# Patient Record
Sex: MALE | Race: AMERICAN INDIAN OR ALASKA NATIVE
[De-identification: names, ages, dates, MRNs, and addresses within clinical notes are randomized per-mention and may not be internally consistent; named-entity substitution may affect disease eponyms.]

---

## 2018-01-24 ENCOUNTER — HOSPITAL ENCOUNTER (INPATIENT)
Dept: HOSPITAL 42 - ED | Age: 83
LOS: 6 days | Discharge: NURSING, REHAB OR LONG-TERM CARE | DRG: 682 | End: 2018-01-30
Attending: INTERNAL MEDICINE | Admitting: INTERNAL MEDICINE
Payer: MEDICARE

## 2018-01-24 VITALS — BODY MASS INDEX: 6.7 KG/M2

## 2018-01-24 VITALS — HEART RATE: 65 BPM

## 2018-01-24 DIAGNOSIS — I48.2: ICD-10-CM

## 2018-01-24 DIAGNOSIS — I25.2: ICD-10-CM

## 2018-01-24 DIAGNOSIS — I27.21: ICD-10-CM

## 2018-01-24 DIAGNOSIS — N17.9: Primary | ICD-10-CM

## 2018-01-24 DIAGNOSIS — I65.23: ICD-10-CM

## 2018-01-24 DIAGNOSIS — E46: ICD-10-CM

## 2018-01-24 DIAGNOSIS — I69.344: ICD-10-CM

## 2018-01-24 DIAGNOSIS — Z79.01: ICD-10-CM

## 2018-01-24 DIAGNOSIS — N40.0: ICD-10-CM

## 2018-01-24 DIAGNOSIS — M40.209: ICD-10-CM

## 2018-01-24 DIAGNOSIS — I35.1: ICD-10-CM

## 2018-01-24 DIAGNOSIS — E78.5: ICD-10-CM

## 2018-01-24 DIAGNOSIS — E87.2: ICD-10-CM

## 2018-01-24 DIAGNOSIS — I08.1: ICD-10-CM

## 2018-01-24 DIAGNOSIS — R33.9: ICD-10-CM

## 2018-01-24 DIAGNOSIS — E78.00: ICD-10-CM

## 2018-01-24 DIAGNOSIS — N25.81: ICD-10-CM

## 2018-01-24 DIAGNOSIS — D61.818: ICD-10-CM

## 2018-01-24 DIAGNOSIS — Z95.810: ICD-10-CM

## 2018-01-24 DIAGNOSIS — I21.4: ICD-10-CM

## 2018-01-24 DIAGNOSIS — E83.42: ICD-10-CM

## 2018-01-24 DIAGNOSIS — I42.0: ICD-10-CM

## 2018-01-24 DIAGNOSIS — K59.00: ICD-10-CM

## 2018-01-24 DIAGNOSIS — N43.3: ICD-10-CM

## 2018-01-24 DIAGNOSIS — I50.21: ICD-10-CM

## 2018-01-24 DIAGNOSIS — I11.0: ICD-10-CM

## 2018-01-24 DIAGNOSIS — E55.9: ICD-10-CM

## 2018-01-24 DIAGNOSIS — I73.9: ICD-10-CM

## 2018-01-24 DIAGNOSIS — M17.0: ICD-10-CM

## 2018-01-24 DIAGNOSIS — M62.82: ICD-10-CM

## 2018-01-24 DIAGNOSIS — I25.10: ICD-10-CM

## 2018-01-24 LAB
ALBUMIN SERPL-MCNC: 4 G/DL (ref 3–4.8)
ALBUMIN/GLOB SERPL: 1 {RATIO} (ref 1.1–1.8)
ALT SERPL-CCNC: 73 U/L (ref 7–56)
APPEARANCE UR: (no result)
AST SERPL-CCNC: 348 U/L (ref 17–59)
BACTERIA #/AREA URNS HPF: (no result) /[HPF]
BASOPHILS # BLD AUTO: 0 K/MM3 (ref 0–2)
BASOPHILS NFR BLD: 0 % (ref 0–3)
BILIRUB UR-MCNC: NEGATIVE MG/DL
BNP SERPL-MCNC: (no result) PG/ML (ref 0–450)
BUN SERPL-MCNC: 57 MG/DL (ref 7–21)
CALCIUM SERPL-MCNC: 9.7 MG/DL (ref 8.4–10.5)
CK MB SERPL-MCNC: 43.1 NG/ML (ref 0–3.6)
CK MB%: 0.4 % (ref 2.5–3)
COLOR UR: YELLOW
EOSINOPHIL # BLD: 0 10*3/UL (ref 0–0.7)
EOSINOPHIL NFR BLD: 0 % (ref 1.5–5)
ERYTHROCYTE [DISTWIDTH] IN BLOOD BY AUTOMATED COUNT: 13.8 % (ref 11.5–14.5)
GFR NON-AFRICAN AMERICAN: 18
GLUCOSE UR STRIP-MCNC: NEGATIVE MG/DL
GRANULOCYTES # BLD: 4.13 10*3/UL (ref 1.4–6.5)
GRANULOCYTES NFR BLD: 76.6 % (ref 50–68)
HGB BLD-MCNC: 15.2 G/DL (ref 14–18)
LEUKOCYTE ESTERASE UR-ACNC: NEGATIVE LEU/UL
LYMPHOCYTES # BLD: 0.7 10*3/UL (ref 1.2–3.4)
LYMPHOCYTES NFR BLD AUTO: 12.8 % (ref 22–35)
MAGNESIUM SERPL-MCNC: 1.7 MG/DL (ref 1.7–2.2)
MCH RBC QN AUTO: 30.6 PG (ref 25–35)
MCHC RBC AUTO-ENTMCNC: 33.3 G/DL (ref 31–37)
MCV RBC AUTO: 91.9 FL (ref 80–105)
MONOCYTES # BLD AUTO: 0.6 10*3/UL (ref 0.1–0.6)
MONOCYTES NFR BLD: 10.6 % (ref 1–6)
PH UR STRIP: 6 [PH] (ref 4.7–8)
PLATELET # BLD: 78 10^3/UL (ref 120–450)
PMV BLD AUTO: 12.6 FL (ref 7–11)
PROT UR STRIP-MCNC: 30 MG/DL
RBC # BLD AUTO: 4.96 10^6/UL (ref 3.5–6.1)
RBC # UR STRIP: (no result) /UL
RBC #/AREA URNS HPF: (no result) /HPF (ref 0–2)
SP GR UR STRIP: >= 1.03 (ref 1–1.03)
T3 SERPL-MCNC: 0.69 NG/ML (ref 0.97–1.69)
T4 FREE SERPL-MCNC: 1.23 NG/DL (ref 0.78–2.19)
TROPONIN I SERPL-MCNC: 0.13 NG/ML
URINE NITRATE: NEGATIVE
UROBILINOGEN UR STRIP-ACNC: 0.2 E.U./DL
WBC # BLD AUTO: 5.4 10^3/UL (ref 4.5–11)
WBC #/AREA URNS HPF: (no result) /HPF (ref 0–6)

## 2018-01-24 NOTE — ED PDOC
Arrival/HPI





- General


Chief Complaint: Weakness/Neurological Deficit


Time Seen by Provider: 01/24/18 18:27





- History of Present Illness


Narrative History of Present Illness (Text): 


01/24/18 18:26


A 89 year old male, whose past medical history includes CVA, HTN, 

hypercholestremia, and Atrial fibrillation, presents to the emergency 

department complaining of generalized weakness for 3 days. Patient reports he 

is unable to get up from bed and has been unable to eat/drink since for 3 days. 

Patient denies any chest pain, shortness of breath, or any other complaints. 

Patient is uncertain who is his PMD. 


Time/Duration: < week (3 days)


Symptom Onset: Gradual


Symptom Course: Unchanged


Activities at Onset: Light


Context: Home





Past Medical History





- Provider Review


Nursing Documentation Reviewed: Yes





- Infectious Disease


Hx of Infectious Diseases: None





- Tetanus Immunization


Tetanus Immunization: Unknown





- Cardiac


Hx Pacemaker: Yes





- Neurological


Hx Paralysis: Yes (HX. R FACIAL DROOP)





- Hematological/Oncological


Hx Blood Transfusions: No





- Musculoskeletal/Rheumatological


Hx Musculoskeletal Disorders: No





- Psychiatric


Hx Depression: No


Hx Emotional Abuse: No


Hx Physical Abuse: No


Hx Substance Use: No





- Past Surgical History


Past Surgical History: Unable to Obtain





- Anesthesia


Hx Anesthesia: No


Hx Anesthesia Reactions: No


Hx Malignant Hyperthermia: No





- Suicidal Assessment


Feels Threatened In Home Enviroment: No





Family/Social History





- Physician Review


Nursing Documentation Reviewed: Yes


Family/Social History: No Known Family HX


Smoking Status: Never Smoked


Hx Alcohol Use: No


Hx Substance Use: No


Hx Substance Use Treatment: No





Allergies/Home Meds


Allergies/Adverse Reactions: 


Allergies





No Known Allergies Allergy (Verified 07/02/16 14:25)


 








Home Medications: 


 Home Meds











 Medication  Instructions  Recorded  Confirmed


 


Ergocalciferol (Vitamin D2) 50,000 iu PO WED 10/17/14 01/24/18





[Vitamin D]   


 


Simvastatin 40 mg PO DAILY 10/17/14 01/24/18


 


Cephalexin [Keflex] 500 mg PO Q12H 07/02/16 01/24/18


 


Lisinopril [Zestril] 2.5 mg PO DAILY 07/02/16 01/24/18


 


Warfarin Sodium [Jantoven] 3 mg PO DAILY 01/24/18 01/24/18














Review of Systems





- Physician Review


All systems were reviewed & negative as marked: Yes





- Review of Systems


Constitutional: Other (weakness)


Gastrointestinal: Appetite Changes (unable to eat/drink for past 3 days (since 

start of symptom))





Physical Exam


Vital Signs Reviewed: Yes


Vital Signs











  Temp Pulse Resp BP Pulse Ox


 


 01/24/18 17:59  97.5 F L    


 


 01/24/18 17:16   82  17  108/79  97











Temperature: Afebrile


Blood Pressure: Normal


Pulse: Regular


Respiratory Rate: Normal


Appearance: Positive for: Well-Appearing


Pain Distress: None


Mental Status: Positive for: Alert and Oriented X 3





- Systems Exam


Head: Present: Atraumatic, Normocephalic


Pupils: Present: PERRL


Extroacular Muscles: Present: EOMI


Conjunctiva: Present: Normal


Mouth: Present: Dry


Neck: Present: Normal Range of Motion


Respiratory/Chest: Present: Clear to Auscultation, Good Air Exchange, Other (

pacemaker to left chest wall).  No: Respiratory Distress, Accessory Muscle Use


Cardiovascular: Present: Regular Rate and Rhythm, Normal S1, S2.  No: Murmurs


Abdomen: Present: Normal Bowel Sounds.  No: Tenderness, Distention, Peritoneal 

Signs


Back: Present: Normal Inspection


Upper Extremity: Present: Normal Inspection.  No: Cyanosis, Edema


Lower Extremity: Present: Normal Inspection.  No: Edema


Neurological: Present: GCS=15, CN II-XII Intact, Speech Normal


Skin: Present: Warm, Dry, Normal Color.  No: Rashes


Psychiatric: Present: Alert, Oriented x 3, Normal Insight, Normal Concentration





Medical Decision Making


ED Course and Treatment: 


01/24/18 18:28


Impression: 89 year old male with weakness. Physical exam shows mucous 

membranes are dry; pacemaker to left chest wall; overall normal physical 

examination.





Plan:


-- EKG


-- Head CT


-- Chest X-ray


-- Labs


-- Urine Culture


-- Urinalysis 


-- Reassess and disposition





Prior Visits:


Notes and results from previous visits were reviewed. Patient was last seen in 

the emergency department on 07/02/2016 for neck pain. Patient was d/c home.





Progress Notes:


EKG:


Ordered, reviewed, and independently interpreted the EKG.


Rate : 63 BPM


Rhythm : Pacemaker.


Interpretation : No ST-segment elevations or depressions, no T-wave inversions, 

normal intervals.


Comparison : No previous EKG for comparison.





01/24/2018 20:21


Head CT


IMPRESSION: 


1. Hypodense encephalomalacia is identified involving the left insular cortex 

and frontal lobe, consistent with an old infarct. 


2. No acute intracranial hemorrhage or acute territorial type infarct.


3. There are scattered foci of hypodensity within the cerebral white matter, 

likely representing small vessel ischemic disease in a patient this age. 


4. Mild atrophy. 


5. Paranasal sinus disease is noted above. If the patient has facial trauma, a 

facial CT is recommended.


Dictator: Isidoro Mayo MD





- Lab Interpretations


Lab Results: 








 01/24/18 19:00 





 01/24/18 19:00 





 Lab Results





01/24/18 19:00: Free T4 1.23, Total T3 0.69 L, TSH 3rd Generation 1.66


01/24/18 19:00: Sodium 142, Potassium 4.3, Chloride 105, Carbon Dioxide 24, 

Anion Gap 18, BUN 57 H, Creatinine 3.3 H, Est GFR ( Amer) 21, Est GFR (

Non-Af Amer) 18, Random Glucose 96, Calcium 9.7, Magnesium 1.7, Total Bilirubin 

1.5 H,  H, ALT 73 H, Alkaline Phosphatase 50, Lactate Dehydrogenase 1819 

H, Total Creatine Kinase 72549 H, CK-MB (CK-2) 43.1 H, CK-MB (CK-2) % 0.4 L, 

Troponin I 0.13 H* D, NT-Pro-B Natriuret Pep 42213 H, Total Protein 7.9, 

Albumin 4.0, Globulin 4.0, Albumin/Globulin Ratio 1.0 L


01/24/18 19:00: WBC 5.4, RBC 4.96, Hgb 15.2, Hct 45.6, MCV 91.9, MCH 30.6, MCHC 

33.3, RDW 13.8, Plt Count 78 L, MPV 12.6 H, Gran % 76.6 H, Lymph % (Auto) 12.8 L

, Mono % (Auto) 10.6 H, Eos % (Auto) 0.0 L, Baso % (Auto) 0.0, Gran # 4.13, 

Lymph # 0.7 L, Mono # 0.6, Eos # 0.0, Baso # 0.00








I have reviewed the lab results: Yes





- RAD Interpretation


Radiology Orders: 








01/24/18 18:29


HEAD W/O CONTRAST [CT] Stat 


CHEST PORTABLE [RAD] Stat 











: ED Physician, Radiologist





- EKG Interpretation


Interpreted by ED Physician: Yes


Type: 12 lead EKG





- Medication Orders


Current Medication Orders: 








Sodium Chloride (Sodium Chloride 0.9%)  1,000 mls @ 250 mls/hr IV .Q4H ONE


   Stop: 01/25/18 00:03


   Last Admin: 01/24/18 20:20  Dose: 250 mls/hr





eMAR Start Stop


 Document     01/24/18 20:20  SF  (Rec: 01/24/18 20:52  SF  [email or web address])


     Intravenous Solution


      Start Date                                 01/24/18


      Start Time                                 20:20


      End Date                                   01/24/18














- Scribe Statement


The provider has reviewed the documentation as recorded by the Jasmine Geronimo





Provider Scribe Attestation:


All medical record entries made by the Dgibdillon were at my direction and 

personally dictated by me. I have reviewed the chart and agree that the record 

accurately reflects my personal performance of the history, physical exam, 

medical decision making, and the department course for this patient. I have 

also personally directed, reviewed, and agree with the discharge instructions 

and disposition.








Disposition/Present on Arrival





- Present on Arrival


Any Indicators Present on Arrival: No


History of DVT/PE: No


History of Uncontrolled Diabetes: No


Urinary Catheter: No


History of Decub. Ulcer: No


History Surgical Site Infection Following: None





- Disposition


Have Diagnosis and Disposition been Completed?: Yes


Diagnosis: 


 Acute renal failure, Weakness, Elevated troponin





Disposition: HOSPITALIZED


Disposition Time: 21:00


Condition: FAIR

## 2018-01-24 NOTE — CT
EXAM:

  CT Head Without Intravenous Contrast



EXAM DATE/TIME:

  1/24/2018 6:29 PM



CLINICAL HISTORY:

  The patient age is 89 years old and is male; Injury or trauma; Fall; Initial 

encounter; Blunt trauma (contusions or hematomas); Additional info: R/O ich 

Facility exam id and description: Ct heads head w/o contrast



TECHNIQUE:

  Axial computed tomography images of the head/brain without intravenous 

contrast.  All CT scans at this facility use one or more dose reduction 

techniques, viz.: automated exposure control; ma/kV adjustment per patient size 

(including targeted exams where dose is matched to indication; i.e. head); or 

iterative reconstruction technique.

  Coronal and sagittal reformatted images were created and reviewed.



COMPARISON:

  No relevant prior studies available.



FINDINGS:

  Brain:  Hypodense encephalomalacia is identified involving the left insular 

cortex and frontal lobe, consistent with an old infarct.  There are scattered 

foci of hypodensity within the cerebral white matter, likely representing small 

vessel ischemic disease in a patient this age.  Hypodense probable artifact is 

seen at the medial aspects of the anterior temporal lobes bilaterally.  The 

acuity of the white matter disease is indeterminate.  The white-gray 

differentiation is otherwise preserved demonstrating no acute territorial type 

infarct.  There is mild prominence of the ventricles and sulci, compatible with 

atrophy.  No acute intracranial hemorrhage is seen.

  Midline shift:  There is no midline shift.

  Ventricles:  A small cavum septum pellucidum variant is visualized.

  Bones/joints:  The calvarium demonstrates no evidence for a depressed 

fracture.

  Soft tissues:  No acute abnormality.

  Vasculature:  There is atherosclerotic calcification of the cavernous 

internal carotid arteries.

  Sinuses:  There is mild mucosal thickening of scattered ethmoid air cells. 

Small air fluid levels are visualized within the right sphenoid sinus.

  Mastoid air cells:  No mastoid effusion.



IMPRESSION:     

1.  Hypodense encephalomalacia is identified involving the left insular cortex 

and frontal lobe, consistent with an old infarct.

2.  No acute intracranial hemorrhage or acute territorial type infarct.

3.  There are scattered foci of hypodensity within the cerebral white matter, 

likely representing small vessel ischemic disease in a patient this age.

4.  Mild atrophy.

5.  Paranasal sinus disease is noted above. If the patient has facial trauma, a 

facial CT is recommended.

## 2018-01-25 LAB
ALBUMIN SERPL-MCNC: 2.9 G/DL (ref 3–4.8)
ALBUMIN/GLOB SERPL: 0.9 {RATIO} (ref 1.1–1.8)
ALT SERPL-CCNC: 63 U/L (ref 7–56)
APTT BLD: 33.4 SECONDS (ref 25.1–36.5)
APTT BLD: 33.4 SECONDS (ref 25.1–36.5)
AST SERPL-CCNC: 218 U/L (ref 17–59)
BASOPHILS # BLD AUTO: 0.01 K/MM3 (ref 0–2)
BASOPHILS NFR BLD: 0.2 % (ref 0–3)
BUN SERPL-MCNC: 52 MG/DL (ref 7–21)
CALCIUM SERPL-MCNC: 8.4 MG/DL (ref 8.4–10.5)
CK MB SERPL-MCNC: 22.2 NG/ML (ref 0–3.6)
CK MB%: 0.5 % (ref 2.5–3)
EOSINOPHIL # BLD: 0 10*3/UL (ref 0–0.7)
EOSINOPHIL NFR BLD: 0 % (ref 1.5–5)
ERYTHROCYTE [DISTWIDTH] IN BLOOD BY AUTOMATED COUNT: 13.8 % (ref 11.5–14.5)
FOLATE SERPL-MCNC: 18.2 NG/ML
GFR NON-AFRICAN AMERICAN: 30
GRANULOCYTES # BLD: 2.69 10*3/UL (ref 1.4–6.5)
GRANULOCYTES NFR BLD: 60.8 % (ref 50–68)
HGB BLD-MCNC: 12.3 G/DL (ref 14–18)
INR PPP: 2.31 (ref 0.93–1.08)
INR PPP: 2.66 (ref 0.93–1.08)
LYMPHOCYTES # BLD: 0.9 10*3/UL (ref 1.2–3.4)
LYMPHOCYTES NFR BLD AUTO: 20.5 % (ref 22–35)
MCH RBC QN AUTO: 30 PG (ref 25–35)
MCHC RBC AUTO-ENTMCNC: 32.4 G/DL (ref 31–37)
MCV RBC AUTO: 92.7 FL (ref 80–105)
MONOCYTES # BLD AUTO: 0.8 10*3/UL (ref 0.1–0.6)
MONOCYTES NFR BLD: 18.5 % (ref 1–6)
PLATELET # BLD: 70 10^3/UL (ref 120–450)
PMV BLD AUTO: 12.6 FL (ref 7–11)
PROTHROMBIN TIME: 26.8 SECONDS (ref 9.4–12.5)
PROTHROMBIN TIME: 31.2 SECONDS (ref 9.4–12.5)
RBC # BLD AUTO: 4.1 10^6/UL (ref 3.5–6.1)
TROPONIN I SERPL-MCNC: 0.09 NG/ML
VIT B12 SERPL-MCNC: 416 PG/ML (ref 239–931)
WBC # BLD AUTO: 4.4 10^3/UL (ref 4.5–11)

## 2018-01-25 RX ADMIN — DOBUTAMINE HYDROCHLORIDE PRN MLS/HR: 200 INJECTION INTRAVENOUS at 12:42

## 2018-01-25 NOTE — CT
PROCEDURE:  CT HEAD WITHOUT CONTRAST.



HISTORY:

Syncope 



COMPARISON:

01/24/2018. 



TECHNIQUE:

Axial computed tomography images were obtained through the head/brain 

without intravenous contrast.  



Radiation dose:



Total exam DLP = 885.41 mGy-cm.



This CT exam was performed using one or more of the following dose 

reduction techniques: Automated exposure control, adjustment of the 

mA and/or kV according to patient size, and/or use of iterative 

reconstruction technique.



FINDINGS:



HEMORRHAGE:

No intracranial hemorrhage. 



BRAIN:

There is redemonstration of cystic encephalomalacia in the left 

frontal and anterior parietal lobes.  There is no mass, mass effect 

or abnormal extra-axial fluid collection.  There are mild chronic 

microangiopathic changes.



VENTRICLES:

There is mild age-related global parenchymal volume loss and 

proportionate enlargement of the ventricles and cortical sulci. 



CALVARIUM:

The skull base and calvarium are normal.



PARANASAL SINUSES:

There is mild mucosal thickening in the right maxillary sinus.  The 

remaining included paranasal sinuses are predominantly clear



MASTOID AIR CELLS:

Predominantly clear.



OTHER FINDINGS:

None.



IMPRESSION:

No acute intracranial abnormality.  



Left frontal and anterior parietal lobe cystic encephalomalacia, 

sequela of remote MCA territory infarction.



Mild chronic microangiopathic changes and mild age-related global 

parenchymal volume loss.

## 2018-01-25 NOTE — CARD
--------------- APPROVED REPORT --------------





EKG Measurement

Heart [id number]

IN [id number]

[id number]

[id number]

[id number]



<Conclusion>

AV sequential or dual chamber electronic pacemaker

PVCs

## 2018-01-25 NOTE — RAD
HISTORY:

r/o infiltrate  



COMPARISON:

03/26/2013 



LUNGS::  The lungs are well inflated and clear.



PLEURA:  No significant pleural effusion identified, no pneumothorax 

apparent.



CARDIOVASCULAR:  There is persistent mild cardiomegaly.  There is 

stable position of a left-sided pacemaker. 



OSSEOUS STRUCTURES:  No significant abnormalities.



VISUALIZED UPPER ABDOMEN:  Normal.



OTHER FINDINGS:  None.



IMPRESSION:

No active pulmonary disease.

## 2018-01-25 NOTE — US
EXAM:

  US Abdomen Complete



EXAM DATE/TIME:

  1/24/2018 11:29 PM



CLINICAL HISTORY:

  The patient age is 89 years old and is male; Pain; Abdominal pain; 

Generalized; Additional info: Transaminitis Facility exam id and description: 

Us abd abdomen complete



TECHNIQUE:

  Real-time ultrasound of the abdomen (complete) with image documentation.



COMPARISON:

  No relevant prior studies available.



FINDINGS:

  Liver:  The liver measures 13.1 cm in length.  There is normal echotexture of 

the liver.

  Gallbladder:  There is wall thickening of the gallbladder. No shadowing 

gallstones are visualized within the gallbladder.  The sonographic Moore sign 

is negative.  There is no visualized sludge within the gallbladder.

  Common bile duct:  There is a tiny focus of hyperechogenicity within the 

common bile duct, and a common bile duct stone is considered, although there is 

no posterior acoustic shadowing to confirm this finding.  The common bile duct 

measures 0.6 cm in diameter, which is at the upper limits of normal.  

  Pancreas:  There is a limited evaluation of the pancreas.

  Kidneys:  The right kidney measures 9.7 x 4.0 x 3.9 cm.  The left kidney 

measures 9.8 x 4.7 x 4.7 cm.  There is no hydronephrosis or shadowing 

nephrolithiasis within the kidneys. Artifact limits evaluation of the left 

kidney.

  Spleen:  The spleen measures 11.4 x 3.8 x 4.4 cm.  The spleen is normal in 

echotexture.

  Aorta: Not imaged.

  Inferior vena cava: Not imaged.



IMPRESSION:     

1.  There is wall thickening of the gallbladder. No shadowing gallstones are 

visualized within the gallbladder.

2.  There is a tiny focus of hyperechogenicity within the common bile duct, and 

a common bile duct stone is considered. The common bile duct measures 0.6 cm in 

diameter, which is at the upper limits of normal.  This can be further 

evaluated with ERCP.

3.  Additional findings described above.

## 2018-01-25 NOTE — CARD
--------------- APPROVED REPORT --------------





EKG Measurement

Heart [id number]

[place] [id number]

[id number]

[id number]

[id number]



<Conclusion>

AV sequential or dual chamber electronic pacemaker

## 2018-01-25 NOTE — CT
EXAM:

  CT Abdomen and Pelvis Without Intravenous Contrast



CLINICAL HISTORY:

  The patient age is 89 years old and is male; Pain; Abdominal pain; 

Generalized; Chest pain; Additional info: Transaminitis. With po contrast-as 

discussed Facility exam id and description: Ct chabdpelpo chest, abdomen, 

pelvis po only



TECHNIQUE:

  Axial computed tomography images of the abdomen and pelvis without 

intravenous contrast.  All CT scans at this facility use one or more dose 

reduction techniques, viz.: automated exposure control; ma/kV adjustment per 

patient size (including targeted exams where dose is matched to indication; 

i.e. head); or iterative reconstruction technique.

  Coronal and sagittal reformatted images were created and reviewed.



COMPARISON:

  No relevant prior studies available.



FINDINGS:

  Limitations:  This study is limited by the absence of intravenous contrast.



 ABDOMEN:

  Liver:  No mass.

  Gallbladder and bile ducts:  There is wall thickening of the gallbladder, 

without discrete gallstones.

  Pancreas:  Normal contour.  No ductal dilation.

  Spleen:  No splenomegaly.

  Adrenals:  No mass.

  Kidneys and ureters:  There is bilateral renal pelviectasis with mild 

bilateral hydroureter. No obstructing calculi are visualized.

  Stomach and bowel:  No obstruction. Moderate fecal material is visualized 

within the rectum. There is wall thickening of the distal stomach, consistent 

with incomplete distention or gastritis.

  Appendix:  No findings to suggest acute appendicitis.



 PELVIS:

  Bladder:  A Calle catheter is visualized within the bladder.  There is 

nonspecific bladder wall thickening with adjacent stranding of the mesentery. 

Cystitis is considered. Additional pathology cannot be excluded.

  Reproductive:  The prostate is enlarged.  There is a right-sided hydrocele 

within the visualized scrotum.



 ABDOMEN and PELVIS:

  Intraperitoneal space:  There is a small amount of free fluid within the 

pelvis.

  Bones/joints:  Hypertrophic degenerative changes are noted within the spine.  

There is grade I anterolisthesis of L3 on L4.

  Soft tissues:  Extensive muscle atrophy is visualized.

  Vasculature:  There is atherosclerotic calcification of the abdominal aorta.  

Additional atherosclerotic changes are identified of the iliac arteries and 

visualized femoral arteries.

  Lymph nodes:  No enlarged lymph nodes.

  Other findings:  There is stranding of the abdominal and pelvic mesenteric, 

which may be inflammatory.



IMPRESSION:     

1.  There is bilateral renal pelviectasis with mild bilateral hydroureter. No 

obstructing calculi are visualized.

2.  There is wall thickening of the gallbladder, without discrete gallstones.  

Clinical correlation and possible ultrasound are recommended.

3.  The prostate is enlarged.

4.  There is nonspecific bladder wall thickening with adjacent stranding of the 

mesentery. Cystitis is considered. Clinical correlation is recommended.

5.  There is a right-sided hydrocele within the visualized scrotum.

6.  There is stranding of the abdominal and pelvic mesenteric, which may be 

inflammatory.

7.  There is a small amount of free fluid within the pelvis.

8.  There is wall thickening of the distal stomach, consistent with incomplete 

distention or gastritis.

9.  Additional CT findings described above.



_______________________________________________



EXAM:

  CT Chest Without Intravenous Contrast



EXAM DATE/TIME:

  1/24/2018 11:10 PM



CLINICAL HISTORY:

  The patient age is 89 years old and is male; Pain; Abdominal pain; 

Generalized; Chest pain; Additional info: Transaminitis. With po contrast-as 

discussed Facility exam id and description: Ct chabdpelpo chest, abdomen, 

pelvis po only



TECHNIQUE:

  Axial computed tomography images of the chest without intravenous contrast.  

All CT scans at this facility use one or more dose reduction techniques, viz.: 

automated exposure control; ma/kV adjustment per patient size (including 

targeted exams where dose is matched to indication; i.e. head); or iterative 

reconstruction technique.

  Coronal and sagittal reformatted images were created and reviewed.



COMPARISON:

  DX - CHEST PORTABLE 2018-01-24 19:02



FINDINGS:

  Lungs:  Patchy nonspecific groundglass density and atelectatic changes are 

visualized within both lower lobes.  Atelectatic change or parenchymal scarring 

is visualized within the right pulmonary apex.  No lung mass.

  Pleural space:  No pneumothorax.  No significant effusion.

  Heart:  There is cardiomegaly.  There is a small amount of pericardial fluid, 

which is greater than fluid attenuation. This is consistent with increased 

complexity of the fluid or hemopericardium.

  Bones/joints:  Osteopenia.  Hypertrophic degenerative changes are noted 

within the spine.  There is increased kyphosis of the thoracic spine.

  Vasculature:  No thoracic aortic aneurysm.

  Lymph nodes:  There is abnormal isodensity within the AP window, consistent 

with lymphadenopathy or extension of the complex pericardial fluid.  Scattered 

additional small mediastinal lymph nodes are visualized, some which are 

calcified.

  Tubes, lines and devices:  A left-sided pacemaker is visualized.  Pacer leads 

are visualized within the heart.



IMPRESSION:     

1.  There is cardiomegaly.

2.  There is a small amount of pericardial fluid, which is greater than fluid 

attenuation. This is consistent with increased complexity of the fluid or 

hemopericardium.

3.  There is abnormal isodensity within the AP window, consistent with 

lymphadenopathy or extension of the complex pericardial fluid.

4.  Patchy nonspecific groundglass density and atelectatic changes are 

visualized within both lower lobes.

5.  Additional CT findings described above.

## 2018-01-25 NOTE — US
PROCEDURE:  Bilateral carotid artery duplex ultrasound 



HISTORY:

Carotid stenosis syncope



PHYSICIAN(S):  Humberto Grace MD.



TECHNIQUE:

Duplex sonography and color-flow Doppler were used to evaluate the 

carotid bifurcations and limited segments of the vertebral arteries 

bilaterally.



FINDINGS:

There is mild smooth heterogeneous plaque noted at the carotid 

bifurcations bilaterally. The peak systolic velocity in the proximal 

right internal carotid artery is 56 cm/sec. This corresponds to a 20 

to 39% proximal right ICA stenosis. Normal systolic velocities are 

noted in the proximal right external carotid artery. There is 

antegrade flow in the right vertebral artery.



The peak systolic velocity in the proximal left internal carotid 

artery is 57 cm/sec. This corresponds to a 20 to 39% proximal left 

ICA stenosis. Normal systolic velocities are noted in the proximal 

left external carotid artery. There is antegrade flow in the left 

vertebral artery.



IMPRESSION:

1. Bilateral 20-39% proximal ICA stenoses.



2. Antegrade flow in both vertebral arteries.

## 2018-01-25 NOTE — CON
DATE:  01/25/2018



HISTORY:  The patient is an 89-year-old male who fell to the floor without

syncope.  Because of his progressive weakness, the patient was unable to

get off the floor and was brought to the hospital.



The patient's past medical history includes a documented end-stage dilated

cardiomyopathy.  The last ejection fraction measured several years ago was

19%

.

He denies shortness of breath.



The patient does not recall the reason why his heart has gotten so weak,

but he does know that he has had a pacemaker and possible defibrillator

placed.  He cannot recall which doctor he sees as an outpatient, does not

recall which cardiologist that he follows up with.



SOCIAL HISTORY:  He denies smoking.



REVIEW OF SYSTEMS:  A 14-point review of systems reviewed.  Negative

angina.  Positive progressive weakness.  Negative shortness of breath at

rest.  Negative edema in the lower extremities.



PHYSICAL EXAMINATION:

VITAL SIGNS:  Blood pressure 110/55, heart rate in the 60s.

NECK:  Negative JVD.

LUNGS:  Decreased breath sounds bilaterally.

HEART:  Reveal S1, S2 within 2/6 systolic ejection murmur.

EXTREMITIES:  Without edema.



DIAGNOSTICS:  EKG shows atrial fibrillation with a paced rhythm.



LABORATORY DATA:  BUN and creatinine is 52 and 2.1.  His LFTs are elevated,

troponin varies from 0.13-0.09.  The hemoglobin is 12.3.



IMPRESSION:

1.  End-stage dilated cardiomyopathy.

2.  Renal insufficiency.

3.  Unclear whether elevated troponins are secondary to renal insufficiency

versus a non-ST segment elevation myocardial infarction.

4.  Progressive low cardiac output with diffuse weakness.

5.  Chronic atrial fibrillation.

6.  History of pacemaker probable defibrillator placement.

7.  Rhabdomyolysis.



PLAN:  Given these findings, we will start the patient on IV dobutamine in

hopes of increasing his cardiac output.

We will obtain an echocardiogram to evaluate his LV function.







__________________________________________

Humberto Berry MD





DD:  01/25/2018 10:17:40

DT:  01/25/2018 10:22:34

Job # [id number]

## 2018-01-25 NOTE — CP.PCM.HP
History of Present Illness





- History of Present Illness


History of Present Illness: 





Chief Complaint: Fall from bed





HPI: Patient is a poor historian. History obtained is limited. Patient is an 89 

year old male with a past medical history of CVA, pacemaker placement states 

about 1.5 days ago when trying to get out of bed, he slid onto his wooden 

floor. Patient states he was unable to get off the floor on his own and as a 

result was confined to the floor until his son came home from work. By time son 

came home patient was able to slide himself from bedroom to the kitchen in 

order to be seen. Patient admits to back pain from the fall he sustained.  

Patient denies syncope, chest pain, shortness of breath, dizziness, fevers, 

chills, cough, abdominal pain. Patient states the days prior he also did not 

have any symptoms.





PMD: Dr. Cordon


Surgical history: Pacemaker placement


Social history: Denies tobacco, alcohol, illicit drug use


Allergies: NKDA


Medications: patient left them at home and cannot recall  medications at the 

moment


Family history: non-contributory














Present on Admission





- Present on Admission


Any Indicators Present on Admission: No





Review of Systems





- Review of Systems


Systems not reviewed;Unavailable: Acuity of Condition





- Constitutional


Constitutional: absent: Chills, Fever, Headache, Weakness





- EENT


Eyes: absent: Blurred Vision, Change in Vision


Ears: absent: Dizziness


Nose/Mouth/Throat: absent: Dysphagia





- Cardiovascular


Cardiovascular: absent: Chest Pain, Dyspnea, Edema, Palpitations





- Respiratory


Respiratory: absent: Cough, Dyspnea





- Gastrointestinal


Gastrointestinal: absent: Diarrhea, Nausea, Vomiting





- Genitourinary


Genitourinary: absent: Difficulty Urinating, Dysuria





- Musculoskeletal


Musculoskeletal: Back Pain.  absent: Numbness





- Neurological


Neurological: absent: Dizziness, Numbness, Vertigo





- Psychiatric


Psychiatric: absent: Anxiety, Confusion





Past Patient History





- Infectious Disease


Hx of Infectious Diseases: None





- Tetanus Immunizations


Tetanus Immunization: Unknown





- Past Social History


Smoking Status: Never Smoked





- CARDIAC


Hx Pacemaker: Yes





- NEUROLOGICAL


Hx Paralysis: Yes (HX. R FACIAL DROOP)





- HEMATOLOGICAL/ONCOLOGICAL


Hx Blood Transfusions: No





- MUSCULOSKELETAL/RHEUMATOLOGICAL


Hx Musculoskeletal Disorders: No





- PSYCHIATRIC


Hx Depression: No


Hx Emotional Abuse: No


Hx Physical Abuse: No


Hx Substance Use: No





- SURGICAL HISTORY


Hx Surgeries: Yes





- ANESTHESIA


Hx Anesthesia: No


Hx Anesthesia Reactions: No


Hx Malignant Hyperthermia: No





Meds


Allergies/Adverse Reactions: 


 Allergies











Allergy/AdvReac Type Severity Reaction Status Date / Time


 


No Known Allergies Allergy   Verified 07/02/16 14:25














Physical Exam





- Constitutional


Appears: Non-toxic, No Acute Distress





- Head Exam


Head Exam: ATRAUMATIC, NORMAL INSPECTION, NORMOCEPHALIC





- Eye Exam


Eye Exam: EOMI, Normal appearance


Pupil Exam: PERRL





- ENT Exam


ENT Exam: Mucous Membranes Moist, Normal Exam





- Neck Exam


Neck exam: Positive for: Normal Inspection





- Respiratory Exam


Respiratory Exam: Clear to Auscultation Bilateral, NORMAL BREATHING PATTERN.  

absent: Rhonchi, Wheezes





- Cardiovascular Exam


Cardiovascular Exam: REGULAR RHYTHM, +S1, +S2





- GI/Abdominal Exam


GI & Abdominal Exam: Normal Bowel Sounds, Soft





- Extremities Exam


Extremities exam: Positive for: full ROM, normal inspection, pedal pulses 

present.  Negative for: calf tenderness, tenderness





- Back Exam


Back exam: paraspinal tenderness, tenderness.  absent: rash noted





- Neurological Exam


Neurological exam: Alert, CN II-XII Intact, Oriented x3





- Psychiatric Exam


Psychiatric exam: Normal Affect, Normal Mood





- Skin


Skin Exam: Intact, Normal Color, Warm





Results





- Vital Signs


Recent Vital Signs: 





 Last Vital Signs











Temp  97.5 F L  01/24/18 17:59


 


Pulse  62   01/24/18 23:03


 


Resp  16   01/24/18 23:03


 


BP  109/54 L  01/24/18 23:03


 


Pulse Ox  96   01/24/18 23:03














- Labs


Result Diagrams: 


 01/25/18 08:00





 01/25/18 08:00


Labs: 





 Laboratory Results - last 24 hr











  01/24/18 01/24/18 01/24/18





  21:30 23:55 23:55


 


PT   26.8 H 


 


INR   2.31 H 


 


APTT   33.4 


 


Uric Acid    8.0


 


Urine Color  Yellow  


 


Urine Appearance  Sl cloudy  


 


Urine pH  6.0  


 


Ur Specific Gravity  >= 1.030  


 


Urine Protein  30 H  


 


Urine Glucose (UA)  Negative  


 


Urine Ketones  Trace H  


 


Urine Blood  Large H  


 


Urine Nitrate  Negative  


 


Urine Bilirubin  Negative  


 


Urine Urobilinogen  0.2  


 


Ur Leukocyte Esterase  Negative  


 


Urine RBC  25 - 30  


 


Urine WBC  0 - 2  


 


Ur Epithelial Cells  10 - 12  


 


Urine Bacteria  Many  














Assessment & Plan





- Assessment and Plan (Free Text)


Assessment: 





Patient is an 89 year old male with a past medical history of CVA, pacemaker 

placement states about 1.5 days ago when trying to get out of bed, he slid onto 

his wooden floor. 


Plan: 





Mechanical Fall vs. Syncopal Episode


-X-ray pelvis,hip,spine,knee 


-CT chest abdomen pelvis PO contrast


-Echocardiogram


-Carotid doppler


-Seizure precautions


-High risk fall precautions


-Neuro checks q2h


-Bedrest


-Cardiology consult


-Neurology consult


-total t14


-B12,Folate 


-Lipid panel 


-HgA1C


-Blood and Urine cx





Rhabdomyolysis


-Will administer 2 liters of IVF@250, then cut back to IVF@125 cc per hour


-Repeat CPK and Troponin in morning





NICK


-Urine + Plasma osmolality, Urine sodium


-Renal US


-Uric acid


-PTH





Thrombocytopenia


-US abdomen 


-lactic acid plasma stat


-STAT PT,PTT





DVT/GI prophylaxis


scds and compression stockings also

## 2018-01-25 NOTE — CP.PCM.CON
<Netta Kuo - Last Filed: 01/25/18 11:31>





History of Present Illness





- History of Present Illness


History of Present Illness: 





Consult: Fall r/o syncope





Mr Bayron Clayton, 89 M, with PMHx CVA with residual LLE weakness, CAD, 

cardiomyopathy w/ AICD, Arrthymia on coumadin, and PVD, came in status post 

fall. When pt tried to get out of bed, he slid onto his wooden floor. Patient 

states he was unable to get off the floor on his own and as a result was 

confined to the floor until his son came home from work. By time son came home 

patient was able to slide himself from bedroom to the kitchen in order to be 

seen. Patient admits to back pain from the fall he sustained. Denied lost of 

consciouness, room spinning, lightheadedness, tinnitis, change of vision, 

sudden weakness. No recent sickness. 





At baseline, pt has lightheadedness when changing position too quickly.  





Head CT: (+) Old infarct at L insular cortex and frontal lobe


    (+) scattered hypodensity in cerebral white matter likely small vessl 

ischemic dz


    Mild atropy


     (+) Ethmoid sinus mucosal thicken. R sphenoid sinus with air/fluid.


Abd U/S: CBD 6mm with suspected stone + Gall bladder wall thickened


CT A/P: renal pelviectasia with b/l hydroureter


Pending spine, knee, hip x-ray





ROS - (+) lightheadedness (+) tinnitis occasionally


Denies syncope, chest pain, shortness of breath, dizziness, fevers, chills, 

cough, abdominal pain. Patient states the days prior he also did not have any 

symptoms.





PMH: 


   CVA with residual LLE weakness (L insular cortex and frontal lobe)


   CAD, Hx NSTEMI


   CHF, severe cardiomyopathy s/p AICD placement, severe mitral regurg


   Arrthythmia on warfarin


   Peripheral vascular disease


   HTN, HLD





PSH:   AICD





FH:     non-contributory





SH:     Denies tobacco, alcohol, illicit drug use


   Lives with grandson.


   Pt is independent in all iADLs and ADLs





Allergies: NKDA





Medications: 


   Warfarin


   Lisinopril, simvastatin, D2





PMD: Dr. Cordon





Past Patient History





- Infectious Disease


Hx of Infectious Diseases: None





- Tetanus Immunizations


Tetanus Immunization: Unknown





- Past Social History


Smoking Status: Never Smoked





- CARDIAC


Hx Congestive Heart Failure: Yes


Hx Hypercholesterolemia: Yes


Hx Hypertension: Yes


Hx Pacemaker: Yes





- NEUROLOGICAL


Hx Paralysis: Yes (HX. R FACIAL DROOP)





- HEMATOLOGICAL/ONCOLOGICAL


Hx Blood Transfusions: No





- MUSCULOSKELETAL/RHEUMATOLOGICAL


Hx Falls: No





- PSYCHIATRIC


Hx Substance Use: No





- SURGICAL HISTORY


Hx Surgeries: Yes





- ANESTHESIA


Hx Anesthesia: No


Hx Anesthesia Reactions: No


Hx Malignant Hyperthermia: No





Meds


Allergies/Adverse Reactions: 


 Allergies











Allergy/AdvReac Type Severity Reaction Status Date / Time


 


No Known Allergies Allergy   Verified 07/02/16 14:25














- Medications


Medications: 


 Current Medications





Atorvastatin Calcium (Lipitor)  20 mg PO DIN SAMARA


Sodium Chloride (Sodium Chloride 0.9%)  1,000 mls @ 100 mls/hr IV .Q10H SAMARA


   Last Admin: 01/25/18 07:00 Dose:  100 mls/hr


Lisinopril (Zestril)  2.5 mg PO DAILY SAMARA











Physical Exam





- Constitutional


Appears: Well





- Head Exam


Head Exam: ATRAUMATIC, NORMAL INSPECTION, NORMOCEPHALIC





- Eye Exam


Eye Exam: EOMI, Normal appearance, PERRL.  absent: Scleral icterus


Pupil Exam: NORMAL ACCOMODATION





- Neck Exam


Additional comments: 





supple





- Respiratory Exam


Respiratory Exam: Clear to Auscultation Bilateral, NORMAL BREATHING PATTERN.  

absent: Rales, Rhonchi, Wheezes





- Cardiovascular Exam


Cardiovascular Exam: REGULAR RHYTHM, +S1, +S2





- GI/Abdominal Exam


GI & Abdominal Exam: Normal Bowel Sounds, Soft.  absent: Tenderness





- Extremities Exam


Extremities exam: Negative for: calf tenderness, pedal edema





- Neurological Exam


Neurological exam: Alert, CN II-XII Intact, Oriented x3


Additional comments: 





Speech: No denture, fluent


recall: 2/3


motor: 5/5 throughout except 4/5 LLE


Sensory: intact


finger-to-nose coordination: intact


rapid alternating movement: intact





Results





- Vital Signs


Recent Vital Signs: 


 Last Vital Signs











Temp  97.6 F   01/25/18 06:00


 


Pulse  61   01/25/18 06:00


 


Resp  20   01/25/18 06:00


 


BP  110/55 L  01/25/18 06:00


 


Pulse Ox  94 L  01/25/18 06:00














- Labs


Result Diagrams: 


 01/25/18 08:00





 01/25/18 08:00


Labs: 


 Laboratory Results - last 24 hr











  01/24/18 01/24/18 01/24/18





  21:30 23:55 23:55


 


WBC   


 


RBC   


 


Hgb   


 


Hct   


 


MCV   


 


MCH   


 


MCHC   


 


RDW   


 


Plt Count   


 


MPV   


 


Gran %   


 


Lymph % (Auto)   


 


Mono % (Auto)   


 


Eos % (Auto)   


 


Baso % (Auto)   


 


Gran #   


 


Lymph #   


 


Mono #   


 


Eos #   


 


Baso #   


 


PT    26.8 H


 


INR    2.31 H


 


APTT    33.4


 


Sodium   


 


Potassium   


 


Chloride   


 


Carbon Dioxide   


 


Anion Gap   


 


BUN   


 


Creatinine   


 


Est GFR ( Amer)   


 


Est GFR (Non-Af Amer)   


 


Random Glucose   


 


Lactic Acid   2.4 H 


 


Uric Acid   


 


Calcium   


 


Total Bilirubin   


 


AST   


 


ALT   


 


Alkaline Phosphatase   


 


Lactate Dehydrogenase   


 


Troponin I   


 


Total Protein   


 


Albumin   


 


Globulin   


 


Albumin/Globulin Ratio   


 


Urine Color  Yellow  


 


Urine Appearance  Sl cloudy  


 


Urine pH  6.0  


 


Ur Specific Gravity  >= 1.030  


 


Urine Protein  30 H  


 


Urine Glucose (UA)  Negative  


 


Urine Ketones  Trace H  


 


Urine Blood  Large H  


 


Urine Nitrate  Negative  


 


Urine Bilirubin  Negative  


 


Urine Urobilinogen  0.2  


 


Ur Leukocyte Esterase  Negative  


 


Urine RBC  25 - 30  


 


Urine WBC  0 - 2  


 


Ur Epithelial Cells  10 - 12  


 


Urine Bacteria  Many  














  01/24/18 01/25/18 01/25/18





  23:55 08:00 08:00


 


WBC   4.4 L 


 


RBC   4.10 


 


Hgb   12.3 L D 


 


Hct   38.0 L 


 


MCV   92.7 


 


MCH   30.0 


 


MCHC   32.4 


 


RDW   13.8 


 


Plt Count   70 L 


 


MPV   12.6 H 


 


Gran %   60.8 


 


Lymph % (Auto)   20.5 L 


 


Mono % (Auto)   18.5 H 


 


Eos % (Auto)   0.0 L 


 


Baso % (Auto)   0.2 


 


Gran #   2.69 


 


Lymph #   0.9 L 


 


Mono #   0.8 H 


 


Eos #   0.0 


 


Baso #   0.01 


 


PT   


 


INR   


 


APTT   


 


Sodium    144


 


Potassium    4.0


 


Chloride    109 H


 


Carbon Dioxide    24


 


Anion Gap    15


 


BUN    52 H


 


Creatinine    2.1 H


 


Est GFR ( Amer)    36


 


Est GFR (Non-Af Amer)    30


 


Random Glucose    76


 


Lactic Acid   


 


Uric Acid  8.0  


 


Calcium    8.4


 


Total Bilirubin    1.1


 


AST    218 H D


 


ALT    63 H


 


Alkaline Phosphatase    35 L D


 


Lactate Dehydrogenase    1155 H


 


Troponin I    0.09  D


 


Total Protein    6.0


 


Albumin    2.9 L


 


Globulin    3.1


 


Albumin/Globulin Ratio    0.9 L


 


Urine Color   


 


Urine Appearance   


 


Urine pH   


 


Ur Specific Gravity   


 


Urine Protein   


 


Urine Glucose (UA)   


 


Urine Ketones   


 


Urine Blood   


 


Urine Nitrate   


 


Urine Bilirubin   


 


Urine Urobilinogen   


 


Ur Leukocyte Esterase   


 


Urine RBC   


 


Urine WBC   


 


Ur Epithelial Cells   


 


Urine Bacteria   














  01/25/18





  08:00


 


WBC 


 


RBC 


 


Hgb 


 


Hct 


 


MCV 


 


MCH 


 


MCHC 


 


RDW 


 


Plt Count 


 


MPV 


 


Gran % 


 


Lymph % (Auto) 


 


Mono % (Auto) 


 


Eos % (Auto) 


 


Baso % (Auto) 


 


Gran # 


 


Lymph # 


 


Mono # 


 


Eos # 


 


Baso # 


 


PT  31.2 H


 


INR  2.66 H


 


APTT  33.4


 


Sodium 


 


Potassium 


 


Chloride 


 


Carbon Dioxide 


 


Anion Gap 


 


BUN 


 


Creatinine 


 


Est GFR ( Amer) 


 


Est GFR (Non-Af Amer) 


 


Random Glucose 


 


Lactic Acid 


 


Uric Acid 


 


Calcium 


 


Total Bilirubin 


 


AST 


 


ALT 


 


Alkaline Phosphatase 


 


Lactate Dehydrogenase 


 


Troponin I 


 


Total Protein 


 


Albumin 


 


Globulin 


 


Albumin/Globulin Ratio 


 


Urine Color 


 


Urine Appearance 


 


Urine pH 


 


Ur Specific Gravity 


 


Urine Protein 


 


Urine Glucose (UA) 


 


Urine Ketones 


 


Urine Blood 


 


Urine Nitrate 


 


Urine Bilirubin 


 


Urine Urobilinogen 


 


Ur Leukocyte Esterase 


 


Urine RBC 


 


Urine WBC 


 


Ur Epithelial Cells 


 


Urine Bacteria 














Assessment & Plan





- Assessment and Plan (Free Text)


Plan: 





Mr Bayron Clayton, 89 M, with PMHx CVA with residual LLE weakness, CAD, 

cardiomyopathy w/ AICD, A-fib on coumadin, and PVD, came in status post fall. 

Denied LOC. He was found to have transaminitis and hyperbilirubinemia with U/S 

showing CBD dilated at 6mm with possible stone. His renal function is reduced 

to creatinin 3.3 on admission 





Near syncope associated with mechanical fall due to deconditioned state from 

old stroke


- Orthostatic VS


- Carotid doppler U/S


- PT/OT for deconditioning and gait training


- Continue  coumadin for A-fib and stroke prevention. Aim INR 2-3


- Hold simvastain 40 for stroke prevention until after acute liver problem 

resolved. No need to add ASA for now.





Imaging:


Head CT: (+) Old infarct at L insular cortex and frontal lobe


    (+) scattered hypodensity in cerebral white matter likely small vessl 

ischemic dz


    Mild atropy


     (+) Ethmoid sinus mucosal thicken. R sphenoid sinus with air/fluid.





s/r/d/w Dr. Mendez








<Edwin Mendez - Last Filed: 01/25/18 14:33>





Meds





- Medications


Medications: 


 Current Medications





Atorvastatin Calcium (Lipitor)  20 mg PO DIN SAMARA


Dobutamine HCl/Dextrose (Dobutamine/Dextrose 5% 500mg/250ml)  500 mg in 250 mls 

@ 9.594 mls/hr IV .Q24H PRN; 5 MCG/KG/MIN


   PRN Reason: Shortness of Breath


   Last Admin: 01/25/18 12:42 Dose:  9.594 mls/hr


Sodium Chloride (Sodium Chloride 0.9%)  1,000 mls @ 125 mls/hr IV .Q8H SAMARA


   Last Admin: 01/25/18 12:43 Dose:  125 mls/hr











Results





- Vital Signs


Recent Vital Signs: 


 Last Vital Signs











Temp  97.6 F   01/25/18 12:00


 


Pulse  60   01/25/18 12:42


 


Resp  20   01/25/18 12:00


 


BP  109/59 L  01/25/18 12:42


 


Pulse Ox  94 L  01/25/18 06:00














- Labs


Result Diagrams: 


 01/25/18 08:00





 01/25/18 08:00


Labs: 


 Laboratory Results - last 24 hr











  01/24/18 01/24/18 01/24/18





  21:30 23:55 23:55


 


WBC   


 


RBC   


 


Hgb   


 


Hct   


 


MCV   


 


MCH   


 


MCHC   


 


RDW   


 


Plt Count   


 


MPV   


 


Gran %   


 


Lymph % (Auto)   


 


Mono % (Auto)   


 


Eos % (Auto)   


 


Baso % (Auto)   


 


Gran #   


 


Lymph #   


 


Mono #   


 


Eos #   


 


Baso #   


 


PT    26.8 H


 


INR    2.31 H


 


APTT    33.4


 


Sodium   


 


Potassium   


 


Chloride   


 


Carbon Dioxide   


 


Anion Gap   


 


BUN   


 


Creatinine   


 


Est GFR ( Amer)   


 


Est GFR (Non-Af Amer)   


 


Random Glucose   


 


Lactic Acid   2.4 H 


 


Uric Acid   


 


Calcium   


 


Total Bilirubin   


 


AST   


 


ALT   


 


Alkaline Phosphatase   


 


Lactate Dehydrogenase   


 


Total Creatine Kinase   


 


CK-MB (CK-2)   


 


CK-MB (CK-2) %   


 


Troponin I   


 


Total Protein   


 


Albumin   


 


Globulin   


 


Albumin/Globulin Ratio   


 


Prostate Specific Ag   


 


Urine Color  Yellow  


 


Urine Appearance  Sl cloudy  


 


Urine pH  6.0  


 


Ur Specific Gravity  >= 1.030  


 


Urine Protein  30 H  


 


Urine Glucose (UA)  Negative  


 


Urine Ketones  Trace H  


 


Urine Blood  Large H  


 


Urine Nitrate  Negative  


 


Urine Bilirubin  Negative  


 


Urine Urobilinogen  0.2  


 


Ur Leukocyte Esterase  Negative  


 


Urine RBC  25 - 30  


 


Urine WBC  0 - 2  


 


Ur Epithelial Cells  10 - 12  


 


Urine Bacteria  Many  














  01/24/18 01/25/18 01/25/18





  23:55 08:00 08:00


 


WBC   4.4 L 


 


RBC   4.10 


 


Hgb   12.3 L D 


 


Hct   38.0 L 


 


MCV   92.7 


 


MCH   30.0 


 


MCHC   32.4 


 


RDW   13.8 


 


Plt Count   70 L 


 


MPV   12.6 H 


 


Gran %   60.8 


 


Lymph % (Auto)   20.5 L 


 


Mono % (Auto)   18.5 H 


 


Eos % (Auto)   0.0 L 


 


Baso % (Auto)   0.2 


 


Gran #   2.69 


 


Lymph #   0.9 L 


 


Mono #   0.8 H 


 


Eos #   0.0 


 


Baso #   0.01 


 


PT   


 


INR   


 


APTT   


 


Sodium    144


 


Potassium    4.0


 


Chloride    109 H


 


Carbon Dioxide    24


 


Anion Gap    15


 


BUN    52 H


 


Creatinine    2.1 H


 


Est GFR ( Amer)    36


 


Est GFR (Non-Af Amer)    30


 


Random Glucose    76


 


Lactic Acid   


 


Uric Acid  8.0  


 


Calcium    8.4


 


Total Bilirubin    1.1


 


AST    218 H D


 


ALT    63 H


 


Alkaline Phosphatase    35 L D


 


Lactate Dehydrogenase    1155 H


 


Total Creatine Kinase    4328 H


 


CK-MB (CK-2)    22.2 H


 


CK-MB (CK-2) %    0.5 L


 


Troponin I    0.09  D


 


Total Protein    6.0


 


Albumin    2.9 L


 


Globulin    3.1


 


Albumin/Globulin Ratio    0.9 L


 


Prostate Specific Ag   


 


Urine Color   


 


Urine Appearance   


 


Urine pH   


 


Ur Specific Gravity   


 


Urine Protein   


 


Urine Glucose (UA)   


 


Urine Ketones   


 


Urine Blood   


 


Urine Nitrate   


 


Urine Bilirubin   


 


Urine Urobilinogen   


 


Ur Leukocyte Esterase   


 


Urine RBC   


 


Urine WBC   


 


Ur Epithelial Cells   


 


Urine Bacteria   














  01/25/18 01/25/18





  08:00 08:00


 


WBC  


 


RBC  


 


Hgb  


 


Hct  


 


MCV  


 


MCH  


 


MCHC  


 


RDW  


 


Plt Count  


 


MPV  


 


Gran %  


 


Lymph % (Auto)  


 


Mono % (Auto)  


 


Eos % (Auto)  


 


Baso % (Auto)  


 


Gran #  


 


Lymph #  


 


Mono #  


 


Eos #  


 


Baso #  


 


PT  31.2 H 


 


INR  2.66 H 


 


APTT  33.4 


 


Sodium  


 


Potassium  


 


Chloride  


 


Carbon Dioxide  


 


Anion Gap  


 


BUN  


 


Creatinine  


 


Est GFR ( Amer)  


 


Est GFR (Non-Af Amer)  


 


Random Glucose  


 


Lactic Acid  


 


Uric Acid  


 


Calcium  


 


Total Bilirubin  


 


AST  


 


ALT  


 


Alkaline Phosphatase  


 


Lactate Dehydrogenase  


 


Total Creatine Kinase  


 


CK-MB (CK-2)  


 


CK-MB (CK-2) %  


 


Troponin I  


 


Total Protein  


 


Albumin  


 


Globulin  


 


Albumin/Globulin Ratio  


 


Prostate Specific Ag   2.1


 


Urine Color  


 


Urine Appearance  


 


Urine pH  


 


Ur Specific Gravity  


 


Urine Protein  


 


Urine Glucose (UA)  


 


Urine Ketones  


 


Urine Blood  


 


Urine Nitrate  


 


Urine Bilirubin  


 


Urine Urobilinogen  


 


Ur Leukocyte Esterase  


 


Urine RBC  


 


Urine WBC  


 


Ur Epithelial Cells  


 


Urine Bacteria  














Attending/Attestation





- Attestation


I have personally seen and examined this patient.: Yes


I have fully participated in the care of the patient.: Yes


I have reviewed all pertinent clinical information: Yes

## 2018-01-26 LAB
ALBUMIN SERPL-MCNC: 2.5 G/DL (ref 3–4.8)
ALBUMIN/GLOB SERPL: 0.9 {RATIO} (ref 1.1–1.8)
ALT SERPL-CCNC: 54 U/L (ref 7–56)
APTT BLD: 36.2 SECONDS (ref 25.1–36.5)
AST SERPL-CCNC: 138 U/L (ref 17–59)
BASOPHILS # BLD AUTO: 0.01 K/MM3 (ref 0–2)
BASOPHILS NFR BLD: 0.3 % (ref 0–3)
BILIRUB DIRECT SERPL-MCNC: 0.4 MG/DL (ref 0–0.4)
BNP SERPL-MCNC: 1180 PG/ML (ref 0–450)
BUN SERPL-MCNC: 31 MG/DL (ref 7–21)
CALCIUM SERPL-MCNC: 8 MG/DL (ref 8.4–10.5)
CK MB SERPL-MCNC: 6.6 NG/ML (ref 0–3.6)
CK MB%: 0.5 % (ref 2.5–3)
EOSINOPHIL # BLD: 0 10*3/UL (ref 0–0.7)
EOSINOPHIL NFR BLD: 0.3 % (ref 1.5–5)
ERYTHROCYTE [DISTWIDTH] IN BLOOD BY AUTOMATED COUNT: 13.7 % (ref 11.5–14.5)
GFR NON-AFRICAN AMERICAN: > 60
GRANULOCYTES # BLD: 2.3 10*3/UL (ref 1.4–6.5)
GRANULOCYTES NFR BLD: 60.3 % (ref 50–68)
HDLC SERPL-MCNC: 35 MG/DL (ref 29–60)
HGB BLD-MCNC: 10.8 G/DL (ref 14–18)
INR PPP: 2.42 (ref 0.93–1.08)
LDLC SERPL-MCNC: 53 MG/DL (ref 0–129)
LYMPHOCYTES # BLD: 0.9 10*3/UL (ref 1.2–3.4)
LYMPHOCYTES NFR BLD AUTO: 23.6 % (ref 22–35)
MAGNESIUM SERPL-MCNC: 1.7 MG/DL (ref 1.7–2.2)
MCH RBC QN AUTO: 30 PG (ref 25–35)
MCHC RBC AUTO-ENTMCNC: 32.2 G/DL (ref 31–37)
MCV RBC AUTO: 93.1 FL (ref 80–105)
MONOCYTES # BLD AUTO: 0.6 10*3/UL (ref 0.1–0.6)
MONOCYTES NFR BLD: 15.5 % (ref 1–6)
OSMOLALITY,URINE: 649 MOSM/KG (ref 300–1000)
PLATELET # BLD: 69 10^3/UL (ref 120–450)
PMV BLD AUTO: 13.1 FL (ref 7–11)
PROTHROMBIN TIME: 28.3 SECONDS (ref 9.4–12.5)
RBC # BLD AUTO: 3.6 10^6/UL (ref 3.5–6.1)
TROPONIN I SERPL-MCNC: 0.08 NG/ML
URATE SERPL-MCNC: 6 MG/DL (ref 3.5–8.5)
WBC # BLD AUTO: 3.8 10^3/UL (ref 4.5–11)

## 2018-01-26 RX ADMIN — DOBUTAMINE HYDROCHLORIDE PRN MLS/HR: 200 INJECTION INTRAVENOUS at 15:51

## 2018-01-26 RX ADMIN — PANTOPRAZOLE SODIUM SCH MG: 40 TABLET, DELAYED RELEASE ORAL at 15:40

## 2018-01-26 NOTE — CARD
--------------- APPROVED REPORT --------------





EXAM: Two-dimensional and M-mode echocardiogram with Doppler and 

color Doppler.



INDICATION

Cardiomyopathy 



2D DIMENSIONS 

Left Atrium (2D)3.8   (1.6-4.0cm)IVSd1.3   (0.7-1.1cm)

LVDd6.1   (3.9-5.9cm)PWd1.2   (0.7-1.1cm)

LVDs5.6   (2.5-4.0cm)FS (%) 8.3   %

LVEF (%)17.9   (>50%)



M-Mode DIMENSIONS 

Aortic Root3.80   (2.2-3.7cm)Aortic Cusp Exc.1.90   (1.5-2.0cm)



Aortic Valve

AoV Peak Rfmjowzs661.0cm/Florencio Peak GR.11mmHg



Mitral Valve

E/A ratio0.0



TDI

E/Lateral E'0.0E/Medial E'0.0



Tricuspid Valve

TR Peak [id number]/sRAP [id number] Peak Gr.32mmHg

[id number]



 LEFT VENTRICLE 

The Left Ventricle is mildly dilated. There is mild concentric left 

ventricular hypertrophy. The systolic function is severely impaired. 

Apical motion consistent with pacemaker activation. Transmitral 

Doppler flow pattern is Grade I-abnormal relaxation pattern.



 RIGHT VENTRICLE 

The right ventricle is normal size. There is normal right ventricular 

wall thickness. The right ventricular systolic function is normal. 

There is a pacemaker lead in the right ventricle.



 ATRIA 

The left atrium size is normal. The right atrium size is normal.



 AORTIC VALVE 

The aortic valve is moderately thickened. There is mild aortic 

regurgitation.



 MITRAL VALVE 

The mitral valve is moderately thickened. Mitral regurgitation is 

mild.



 TRICUSPID VALVE 

There is mild tricuspid regurgitation. There is mild pulmonary 

hypertension.



 GREAT VESSELS 

The aortic root is mildly enlarged. The IVC collapses <50% with 

inspiration.



<Conclusion>

The Left Ventricle is mildly dilated.

There is mild concentric left ventricular hypertrophy.

The systolic function is severely impaired.

Apical motion consistent with pacemaker activation.

Transmitral Doppler flow pattern is Grade I-abnormal relaxation 

pattern.

There is mild aortic regurgitation.The aortic root is mildly enlarged.

Mitral regurgitation is mild.

There is mild tricuspid regurgitation.

There is mild pulmonary hypertension.

## 2018-01-26 NOTE — RAD
PROCEDURE:  Spine series



HISTORY:

fall



COMPARISON:

None.



TECHNIQUE:

Standard protocol for this study/examination.



FINDINGS:

Cervical spine: Limited to a lateral view. Limited to the upper 6 

cervical vertebral bodies. This is a nondiagnostic study in the 

assessment of cervical pathology. Incidental finding(s): Degenerative 

changes C5-6.



Thoracic spine: No acute findings.



Lumbar spine: Mild dextroscoliosis.  Secondary degenerative changes 

are mild.



IMPRESSION:

No gross abnormalities identified. If clinical suspicion remains for 

cervical thoracic or lumbar pathology additional targeted directed 

imaging recommended.

## 2018-01-26 NOTE — PN
DATE:



SUBJECTIVE:  The patient is in the [facility] in [place],

room 377, bed 1.  The patient was admitted when he presented in the

Emergency Room.  He was brought in because he was found by his son on the

floor.  He said that he has not eaten for several days and he was very weak

and he sat down and he could not get out of the floor.  The patient has

significant past history of cerebrovascular disease.  The patient has a

history of stroke in the past, hypertension, and hyperlipidemia. 

Evaluation in the Emergency Room, the patient was admitted for possible

effects of syncope, but not _____ defined.  The patient was seen this

morning.  He had echocardiogram done in the lab this morning.  The patient

is able to stop.  He seems to be comfortable.  The patient is able to

communicate.  He has history of dilated cardiomyopathy, atrial

fibrillation, and cerebrovascular accident.  The patient also has a history

of gallbladder disease and enlarged prostate.  The patient has hydrocele on

the right side.  The patient has past history of possible chronic

gallbladder disease.



PHYSICAL EXAMINATION:

VITAL SIGNS:  This morning, the pulse is 77, blood pressure is 127/58,

respirations are 19, and O2 sat is 95% on room air.

LUNGS:  Clinically clear.

HEART:  Normal sinus rhythm.  S1 and S2 present.



PLAN:  The patient is not in atrial fibrillation at this time.  The patient

has been evaluated and treated for his general weakness and congestive

cardiomyopathy.  We will continue current management and followup.





__________________________________________

Praveen Viramontes MD





DD:  01/26/2018 8:21:35

DT:  01/26/2018 8:51:24

Job # [id number]

## 2018-01-26 NOTE — RAD
PROCEDURE:  Radiographs of the pelvis and bilateral hips



HISTORY:

Posttraumatic pain



COMPARISON:

None.



FINDINGS:



BONES:

Pelvis: Unremarkable.



Right hip:Unremarkable.



Left hip:Unremarkable.



JOINTS:

Right hip: Mild degenerative changes.



Left hip: Symmetrical degenerative change.



Sacroiliac Joints: Unremarkable.



Pubic symphysis: Unremarkable.



SOFT TISSUES:

Normal. 



OTHER FINDINGS:

None.



IMPRESSION:

No acute findings related to/accounting  for the clinical 

presentation. Additional benign and/or incidental findings described 

above.

## 2018-01-26 NOTE — CP.PCM.PN
<AyaanNetta - Last Filed: 01/26/18 10:56>





Subjective





- Date & Time of Evaluation


Date of Evaluation: 01/26/18


Time of Evaluation: 09:00





- Subjective


Subjective: 





Neurology


PGY-2 for Dr. Mendez





Pt has no acute complain. He states that his L leg is good leg but weak.





Objective





- Vital Signs/Intake and Output


Vital Signs (last 24 hours): 


 











Temp Pulse Resp BP Pulse Ox


 


 98.6 F   69   19   127/58 L  95 


 


 01/26/18 05:57  01/26/18 10:00  01/26/18 05:57  01/26/18 05:57  01/26/18 05:57








Intake and Output: 


 











 01/26/18 01/26/18





 06:59 18:59


 


Intake Total 680 


 


Output Total 900 


 


Balance -220 














- Medications


Medications: 


 Current Medications





Dobutamine HCl/Dextrose (Dobutamine/Dextrose 5% 500mg/250ml)  500 mg in 250 mls 

@ 9.594 mls/hr IV .Q24H PRN; 5 MCG/KG/MIN


   PRN Reason: Shortness of Breath


   Last Admin: 01/25/18 12:42 Dose:  9.594 mls/hr


Sodium Chloride (Sodium Chloride 0.9%)  1,000 mls @ 125 mls/hr IV .Q8H SAMARA


   Last Admin: 01/26/18 02:07 Dose:  125 mls/hr


Pantoprazole Sodium (Protonix Ec Tab)  40 mg PO 0600,1600 SAMARA











- Labs


Labs: 


 





 01/26/18 05:20 





 01/26/18 05:20 





 











PT  28.3 SECONDS (9.4-12.5)  H  01/26/18  05:20    


 


INR  2.42  (0.93-1.08)  H  01/26/18  05:20    


 


APTT  36.2 Seconds (25.1-36.5)   01/26/18  05:20    














- Constitutional


Appears: No Acute Distress





- Head Exam


Head Exam: ATRAUMATIC, NORMAL INSPECTION, NORMOCEPHALIC





- Eye Exam


Eye Exam: EOMI, Normal appearance, PERRL


Pupil Exam: NORMAL ACCOMODATION





- ENT Exam


ENT Exam: Mucous Membranes Moist





- Neck Exam


Additional comments: 





supple





- Respiratory Exam


Respiratory Exam: Clear to Ausculation Bilateral, NORMAL BREATHING PATTERN.  

absent: Rales, Rhonchi, Wheezes





- Cardiovascular Exam


Cardiovascular Exam: REGULAR RHYTHM, +S1, +S2.  absent: Murmur





- GI/Abdominal Exam


GI & Abdominal Exam: Soft, Normal Bowel Sounds.  absent: Guarding, Tenderness





- Extremities Exam


Extremities Exam: absent: Calf Tenderness





- Neurological Exam


Neurological Exam: Alert, Awake


Additional comments: 





Speech: No denture, fluent


recall: 2/3


motor: 5/5 UE b/l; 4-/5 RLE; 4+/5 LLE


Sensory: intact


finger-to-nose coordination: intact


rapid alternating movement: intact





Assessment and Plan





- Assessment and Plan (Free Text)


Plan: 





Consult: Fall r/o syncope





Mr Bayron Clayton, 89 M, with PMHx CVA with residual LLE weakness, CAD, 

cardiomyopathy w/ AICD, A-fib on coumadin, and PVD, came in status post fall. 

Denied LOC. He was found to have transaminitis and hyperbilirubinemia with U/S 

showing CBD dilated at 6mm with possible stone. His renal function is reduced 

to creatinin 3.3 on admission possibly due to NICK for rhabdomyolysis. His 

troponin remains high at 0.08 despite creatinine improving to 1.1. Pending plan 

to cardiac catherterization. Pt is on dobutamine to support heart function.





Near syncope and Fall secondary to transient cerebral hypopurfusion from 

dilated cardiomyopathy vs mechanical fall due to deconditioned state from old 

stroke


- Orthostatic VS negative


- Carotid doppler U/S: 20-39% b/l proximal ICA stenosis


- PT/OT for deconditioning and gait training


- Continue  coumadin for A-fib and stroke prevention. Aim INR 2-3


- Hold simvastain 40 for stroke prevention until after acute liver problem 

resolved. No need to add ASA for now.


- Maintain gentle hydration throughout the day


- maintain sbp 120-130


- avoid sudden drop in BP





Imaging:


   Head CT: (+) Old infarct at L insular cortex and frontal lobe


       (+) scattered hypodensity in cerebral white matter likely small vessl 

ischemic dz


       Mild atropy


        (+) Ethmoid sinus mucosal thicken. R sphenoid sinus with air/fluid.





Suspected Choledocholithiasis 


Transaminitis with hyperbilirubinemia


- consider surgery and/or GI consult.





Elevated trops at 0.08 with Cre 1.1


- Pending plan to cardiac catherterization





s/r/d/w Dr. Mendez








<VanessaEdwin - Last Filed: 01/26/18 11:20>





Objective





- Vital Signs/Intake and Output


Vital Signs (last 24 hours): 


 











Temp Pulse Resp BP Pulse Ox


 


 98.6 F   69   19   127/58 L  95 


 


 01/26/18 05:57  01/26/18 10:00  01/26/18 05:57  01/26/18 05:57  01/26/18 05:57








Intake and Output: 


 











 01/26/18 01/26/18





 06:59 18:59


 


Intake Total 680 


 


Output Total 900 


 


Balance -220 














- Medications


Medications: 


 Current Medications





Dobutamine HCl/Dextrose (Dobutamine/Dextrose 5% 500mg/250ml)  500 mg in 250 mls 

@ 9.594 mls/hr IV .Q24H PRN; 5 MCG/KG/MIN


   PRN Reason: Shortness of Breath


   Last Admin: 01/25/18 12:42 Dose:  9.594 mls/hr


Sodium Chloride (Sodium Chloride 0.9%)  1,000 mls @ 125 mls/hr IV .Q8H SAMARA


   Last Admin: 01/26/18 02:07 Dose:  125 mls/hr


Pantoprazole Sodium (Protonix Ec Tab)  40 mg PO 0600,1600 SAMARA











- Labs


Labs: 


 





 01/26/18 05:20 





 01/26/18 05:20 





 











PT  28.3 SECONDS (9.4-12.5)  H  01/26/18  05:20    


 


INR  2.42  (0.93-1.08)  H  01/26/18  05:20    


 


APTT  36.2 Seconds (25.1-36.5)   01/26/18  05:20    














Attending/Attestation





- Attestation


I have personally seen and examined this patient.: Yes


I have fully participated in the care of the patient.: Yes


I have reviewed all pertinent clinical information, including history, physical 

exam and plan: Yes

## 2018-01-26 NOTE — RAD
PROCEDURE:  Bilateral Knee Radiographs.



HISTORY:

fall



COMPARISON:

None.



FINDINGS:



BONES:

Right Knee: No acute fracture. No acute fracture. Proliferative 

hypertrophic changes emanating from the femoral condyle and tibial 

plateau regions. 



Left Knee: No acute fracture. No acute fracture. Proliferative 

hypertrophic changes emanating from the femoral condyle and tibial 

plateau regions. 



JOINTS:

Right Knee: No appreciable osteoarthritic change. 



Left knee: No appreciable osteoarthritic change. 



SOFT TISSUES:

Right Knee: Normal.



Left Knee: Normal.



JOINT EFFUSION:

Right Knee: None. 



Left Knee: None.



OTHER FINDINGS:

None.



IMPRESSION:

No acute findings related to/accounting  for the clinical 

presentation.

## 2018-01-26 NOTE — PN
DATE:  01/26/2018



SUBJECTIVE:  The patient's is feeling well.  His BUN and creatinine is

dramatically improved, on IV dobutamine.



His troponin remains elevated.



PHYSICAL EXAMINATION:

VITAL SIGNS:  Blood pressure is 127/58, heart rate in the 60s.

NECK:  Negative JVD.

LUNGS:  Without rales.

HEART:  Reveal S1, S2.

EXTREMITIES:  Without edema.



LABORATORY DATA:  BUN and creatinine is 31 and 1.1, hemoglobin is 10.8,

platelet count is 69.



IMPRESSION:

1.  Improvement of cardiac output with IV dobutamine.

2.  Severe dilated cardiomyopathy.

3.  Non-ST elevation myocardial infarction.

4.  Coronary artery disease.

5.  Chronic atrial fibrillation.

6.  History of pacemaker.



PLAN:  Given these findings, the patient should have a cardiac

catheterization to rule out significant coronary disease given his elevated

troponins.  However, his INR remains elevated while on Coumadin.  We will

defer the procedure.  We will defer cardiac catheterization until Monday.







__________________________________________

Humberto Berry MD







DD:  01/26/2018 10:42:28

DT:  01/26/2018 10:47:58

Job # [id number]

## 2018-01-27 LAB
ALBUMIN SERPL-MCNC: 2.5 G/DL (ref 3–4.8)
ALBUMIN/GLOB SERPL: 0.9 {RATIO} (ref 1.1–1.8)
ALT SERPL-CCNC: 50 U/L (ref 7–56)
APTT BLD: 33.1 SECONDS (ref 25.1–36.5)
AST SERPL-CCNC: 105 U/L (ref 17–59)
BASOPHILS # BLD AUTO: 0.01 K/MM3 (ref 0–2)
BASOPHILS NFR BLD: 0.2 % (ref 0–3)
BILIRUB DIRECT SERPL-MCNC: 0.2 MG/DL (ref 0–0.4)
BNP SERPL-MCNC: 6010 PG/ML (ref 0–450)
BUN SERPL-MCNC: 14 MG/DL (ref 7–21)
CALCIUM SERPL-MCNC: 8.1 MG/DL (ref 8.4–10.5)
CK MB SERPL-MCNC: 3.8 NG/ML (ref 0–3.6)
EOSINOPHIL # BLD: 0 10*3/UL (ref 0–0.7)
EOSINOPHIL NFR BLD: 0.7 % (ref 1.5–5)
ERYTHROCYTE [DISTWIDTH] IN BLOOD BY AUTOMATED COUNT: 13.7 % (ref 11.5–14.5)
GFR NON-AFRICAN AMERICAN: > 60
GRANULOCYTES # BLD: 2.51 10*3/UL (ref 1.4–6.5)
GRANULOCYTES NFR BLD: 61.9 % (ref 50–68)
HGB BLD-MCNC: 10.7 G/DL (ref 14–18)
INR PPP: 1.88 (ref 0.93–1.08)
LYMPHOCYTES # BLD: 1.1 10*3/UL (ref 1.2–3.4)
LYMPHOCYTES NFR BLD AUTO: 25.9 % (ref 22–35)
MAGNESIUM SERPL-MCNC: 1.5 MG/DL (ref 1.7–2.2)
MCH RBC QN AUTO: 30.3 PG (ref 25–35)
MCHC RBC AUTO-ENTMCNC: 32.9 G/DL (ref 31–37)
MCV RBC AUTO: 92.1 FL (ref 80–105)
MONOCYTES # BLD AUTO: 0.5 10*3/UL (ref 0.1–0.6)
MONOCYTES NFR BLD: 11.3 % (ref 1–6)
PLATELET # BLD: 56 10^3/UL (ref 120–450)
PMV BLD AUTO: 11 FL (ref 7–11)
PROTHROMBIN TIME: 21.9 SECONDS (ref 9.4–12.5)
RBC # BLD AUTO: 3.53 10^6/UL (ref 3.5–6.1)
TROPONIN I SERPL-MCNC: 0.05 NG/ML
URATE SERPL-MCNC: 3.8 MG/DL (ref 3.5–8.5)
WBC # BLD AUTO: 4.1 10^3/UL (ref 4.5–11)

## 2018-01-27 RX ADMIN — DOBUTAMINE HYDROCHLORIDE PRN MLS/HR: 200 INJECTION INTRAVENOUS at 22:40

## 2018-01-27 RX ADMIN — PANTOPRAZOLE SODIUM SCH MG: 40 TABLET, DELAYED RELEASE ORAL at 16:51

## 2018-01-27 RX ADMIN — PANTOPRAZOLE SODIUM SCH MG: 40 TABLET, DELAYED RELEASE ORAL at 05:20

## 2018-01-27 NOTE — PN
DATE:



SUBJECTIVE:  The patient is seen this morning.  He is lying in bed.  He is

comfortable.  The patient is on dobutamine drip for congestive

cardiomyopathy.  The patient was admitted with weakness, unable to eat. 

The patient was found on the floor by the son and he was brought into the

hospital.  He is a very cheerful man and answers all questions.  He has

past history of stroke.  He had atherosclerotic heart disease.



PHYSICAL EXAMINATION:

VITAL SIGNS:  His pulse is 59, blood pressure is 144/63, respirations 18,

O2 sat is 93% on room air.  The patient's temperature is 98.6.

HEENT:  Head is normocephalic.

NECK:  Thyroid is not enlarged.  JVP is flat.  Carotid pulses are present.

HEART:  Normal sinus rhythm.

LUNGS:  Trachea is central.  Breath sounds are vesicular.  No adventitious

sounds.

ABDOMEN:  Soft.  Liver and spleen not palpable.

CENTRAL NERVOUS SYSTEM:  The patient has no focal deficit at this time.  He

has weakness.



The patient is on multiple medications.  He is on dobutamine drip as

mentioned.  The patient is on Protonix.  The patient is on Angiomax.  The

patient is on heparin 2000 units per hour.  The patient is on Plavix.



LABORATORY DATA:  The lab work done in the hospital.  The hemoglobin 10.8. 

The patient's chemistry:  Sodium is 141, the patient's BUN and creatinine

are within normal range.  The patient's creatine kinase is abnormal, it is

1343 but it was elevated to 4329 and the patient probably had

rhabdomyolysis because he was on the floor for hours.  The patient's LDH

also was elevated at that time.  His BNP is elevated to 1180, which is

consistent with congestive cardiomyopathy.  Total protein is 5.4, which is

low.  The albumin is 2.5, which is low.  The patient needs nutritional

support.  We will continue all medications and follow up.  His condition is

improved.













__________________________________________

Praveen Viramontes MD



DD:  01/27/2018 8:06:53

DT:  01/27/2018 10:05:33

Job # [id number]

## 2018-01-27 NOTE — PN
DATE:  01/27/2018



CARDIOLOGY FOLLOWUP



SUBJECTIVE:  The patient is breathing better on IV dobutamine.  No chest

pain noted.



PHYSICAL EXAMINATION:

VITAL SIGNS:  Blood pressure is 144/63, heart rate in the 60s.

NECK:  Negative JVD.

LUNGS:  Without rales.

HEART:  S1, S2.

EXTREMITIES:  Without edema.



LABORATORY DATA:  INR is 1.88.  Chemistries:  BUN and creatinine are

unremarkable.  Potassium is 4.0.



IMPRESSION:

1.  Non-ST elevation myocardial infarction.

2.  Severe cardiomyopathy.

3.  Congestive heart failure which is better on IV dobutamine.

4.  Renal insufficiency.

5.  Chronic atrial fibrillation.

6.  History of pacemaker.



PLAN:  Given these findings, we will continue the IV dobutamine.  The

patient is scheduled for cardiac catheterization on Monday.





__________________________________________

Humberto Berry MD







DD:  01/27/2018 11:25:01

DT:  01/27/2018 11:28:21

Job # [id number]

## 2018-01-28 LAB
ALBUMIN SERPL-MCNC: 2.4 G/DL (ref 3–4.8)
ALBUMIN/GLOB SERPL: 0.9 {RATIO} (ref 1.1–1.8)
ALT SERPL-CCNC: 48 U/L (ref 7–56)
APTT BLD: 30.7 SECONDS (ref 25.1–36.5)
AST SERPL-CCNC: 77 U/L (ref 17–59)
BASOPHILS # BLD AUTO: 0.01 K/MM3 (ref 0–2)
BASOPHILS NFR BLD: 0.2 % (ref 0–3)
BILIRUB DIRECT SERPL-MCNC: 0.2 MG/DL (ref 0–0.4)
BNP SERPL-MCNC: 8450 PG/ML (ref 0–450)
BUN SERPL-MCNC: 11 MG/DL (ref 7–21)
CALCIUM SERPL-MCNC: 7.9 MG/DL (ref 8.4–10.5)
CK MB SERPL-MCNC: 2.9 NG/ML (ref 0–3.6)
EOSINOPHIL # BLD: 0.1 10*3/UL (ref 0–0.7)
EOSINOPHIL NFR BLD: 1.9 % (ref 1.5–5)
ERYTHROCYTE [DISTWIDTH] IN BLOOD BY AUTOMATED COUNT: 13.5 % (ref 11.5–14.5)
GFR NON-AFRICAN AMERICAN: > 60
GRANULOCYTES # BLD: 2.32 10*3/UL (ref 1.4–6.5)
GRANULOCYTES NFR BLD: 56.4 % (ref 50–68)
HGB BLD-MCNC: 10.2 G/DL (ref 14–18)
INR PPP: 1.61 (ref 0.93–1.08)
LYMPHOCYTES # BLD: 1.3 10*3/UL (ref 1.2–3.4)
LYMPHOCYTES NFR BLD AUTO: 30.8 % (ref 22–35)
MAGNESIUM SERPL-MCNC: 1.4 MG/DL (ref 1.7–2.2)
MCH RBC QN AUTO: 29.9 PG (ref 25–35)
MCHC RBC AUTO-ENTMCNC: 33 G/DL (ref 31–37)
MCV RBC AUTO: 90.6 FL (ref 80–105)
MONOCYTES # BLD AUTO: 0.4 10*3/UL (ref 0.1–0.6)
MONOCYTES NFR BLD: 10.7 % (ref 1–6)
PLATELET # BLD: 59 10^3/UL (ref 120–450)
PMV BLD AUTO: 11.2 FL (ref 7–11)
PROTHROMBIN TIME: 18.7 SECONDS (ref 9.4–12.5)
RBC # BLD AUTO: 3.41 10^6/UL (ref 3.5–6.1)
TROPONIN I SERPL-MCNC: 0.03 NG/ML
URATE SERPL-MCNC: 3.4 MG/DL (ref 3.5–8.5)
WBC # BLD AUTO: 4.1 10^3/UL (ref 4.5–11)

## 2018-01-28 RX ADMIN — PANTOPRAZOLE SODIUM SCH MG: 40 TABLET, DELAYED RELEASE ORAL at 06:13

## 2018-01-28 RX ADMIN — PANTOPRAZOLE SODIUM SCH MG: 40 TABLET, DELAYED RELEASE ORAL at 16:56

## 2018-01-28 NOTE — PN
DATE:  01/28/2018



CARDIOLOGY FOLLOWUP NOTE



SUBJECTIVE:  The patient's breathing is stable.



OBJECTIVE:

VITAL SIGNS:  Blood pressure is 125/64 and heart rate is in the 70s.

NECK:  Negative JVD.

LUNGS:  Without rales.

HEART:  S1 and S2.

EXTREMITIES:  Without edema.



LABORATORY DATA:  Hemoglobin is 10.2.  BUN and creatinine are back to

normal at 11 and 0.8.  The INR is down to 1.61.



IMPRESSION:

1.  Severe cardiomyopathy.

2.  High probability for coronary artery disease.

3.  Low cardiac output syndrome improved with IV dobutamine.

4.  Non-ST-elevation myocardial infarction.

5.  History of chronic atrial fibrillation.

6.  History of pacemaker placement.



PLAN:  Given these findings, we will give the patient one dose of subcu

Lovenox today.



The patient is scheduled for cardiac catheterization in the morning.





__________________________________________

Humberto Berry MD





DD:  01/28/2018 10:52:04

DT:  01/28/2018 10:55:38

Job # [id number]

## 2018-01-28 NOTE — PN
DATE:



The patient is in [facility], Room 377, bed 1.



SUBJECTIVE:  The patient was admitted after being evaluated in the

Emergency Room.  The patient was brought in because he was found on the

floor and he had denied as to how he fell or not, but he was found by his

son.  The patient was short of breath and weak.  The patient has not eaten

for couple of days and apparently _____ this history.  The patient was

evaluated this morning, he was lying down on bed.  He is getting dobutamine

drip for congestive cardiomyopathy.  The patient has history of chronic

cerebrovascular disease, coronary artery disease, hypertension, and

hyperlipidemia.



PHYSICAL EXAMINATION:

VITAL SIGNS:  His vital signs, at this time today, his blood pressure is

125/64, respirations are 20, his pulse is 71, his temperature is 98.5, and

his O2 saturation is 92% on room air.

LUNGS:  Clinically, scattered crepitations are heard.  Breath sounds are

diminished.

HEART:  Normal sinus rhythm.  Sinus tachycardia.

ABDOMEN:  Soft.  Liver and spleen not palpable.

CENTRAL NERVOUS SYSTEM:  The patient is conscious, rational, oriented,

answers all questions.  Seemed to be very clear.  He is able to get up and

go to the bathroom and he is getting all his medications for his heart

failure and he is on dobutamine drip, which is being monitored by Dr. Humberto Berry,who is the cardiologist.



LABORATORY DATA:  His recent blood work done, the hemoglobin is 10.2.  The

patient's chemistry, the blood sugar is within normal range.  The patient's

liver enzymes not abnormal, but his creatinine kinase is elevated.  The

patient was lying on the floor.  He probably had some evidence of

rhabdomyolysis.  His BNP is off course elevated, consistent with congestive

cardiomyopathy.



We will continue his current medications:  The patient is currently on

dobutamine drip.  He is getting Plavix, pantoprazole, and aspirin.



Diet is heart-healthy diet.





__________________________________________

Praveen Viramontes MD





DD:  01/28/2018 8:45:48

DT:  01/28/2018 10:33:27

Job # [id number]

## 2018-01-28 NOTE — PN
DATE:  01/25/2018



SUBJECTIVE:  The patient was seen in room 377, bed 1.  The patient is

sitting up in the bed.  The patient is having breakfast at this time. 

Overnight nurse's notes were reviewed.  The patient was found to be alert,

awake, responsive by the nurses, oriented x3.  The patient had a Calle

catheter placed, which was draining the urine.  The patient is on

telemetry.  The patient is started on dobutamine drip as per Dr. Humberto Berry's order.



PHYSICAL EXAMINATION

VITAL SIGNS:  T-max 98.4-97.6, blood pressure 115/67, 113/67, 110/55,

109/60, respiration 16 to 18 to 20 per minute, O2 sat 95%, 96%,94%.  Intake

and output; 600 and 700.

HEENT:  The patient's head examination normocephalic and atraumatic.  HEENT

examination shows pinkish conjunctivae.  Anicteric sclerae.  No

oropharyngeal lesion.

NECK:  Positive kyphosis.

CHEST:  Positive left upper chest pacemaker noted.

LUNGS:  No audible crackle, rales or wheezing.

CARDIOVASCULAR:  S1 and S2, regular rhythm.  Positive systolic murmur, left

sternal border, left second intercostal space, right second intercostal

space.

ABDOMEN:  Soft.  Positive bowel sounds.  No hepatosplenomegaly noted.  No

guarding.  No rigidity.  No rebound tenderness.

GENITALIA:  Male.  Positive Calle catheter.

RECTAL:  Deferred.

EXTREMITIES:  Shows no swelling, no edema, no calf numbness, no Gaye's

signs.

NEUROLOGIC:  The patient is alert, awake, responsive, is able to move upper

and lower extremity without assistance.

MUSCULOSKELETAL:  Shows a body mass index of 21.



DIAGNOSTICS:  On the 01/25/2018, WBC 4.4, hemoglobin/hematocrit 12.3/38.0

and platelet 70,000.  PT/PTT 31.2 and INR 2.6.



The chemistry from 01/25/2018, sodium 144, potassium 4.0, chloride 109, CO2

of 24, anion gap 15, BUN 52, creatinine 2.1, GFR 36, glucose 76, calcium

8.4, , ALT 63 and alk phos 75.  LDH 1155.  CPK 4328.  Troponin is

0.09 came down from 0.13.  Albumin 2.9.  PTH is 65.  Urinalysis noted. 

Blood cultures and urine cultures negative.  The patient had CT abdomen and

pelvis, CT head, ultrasound of the abdomen, hips and knees x-rays and spine

x-rays and carotid ultrasound and a repeat CT head all of them were

reviewed.  The patient's EKG was done which shows paced rhythm.



IMPRESSION AND PLAN:

1.  Syncope, etiology undetermined.

2.  Status post fall.

3.  History of cerebral infarct.

4.  Permanent pacemaker implant.

5.  Questionable mechanical fall.

6.  Acute renal failure and acute kidney injury.

7.  Thrombocytopenia.

8.  History of hypovitaminosis D.

9.  History of hyperlipidemia.

10.  Gait dysfunction.

11.  Leukopenia.

12.  Lactic acidosis.

13.  Transaminitis.

14.  Acute rhabdomyolysis with CPK greater than 10,000.

15.  Questionable non-ST elevation myocardial infarction with elevated

troponin.

16.  Questionable congestive heart failure either diastolic or systolic,

etiology undetermined.

17.  Mild hypoalbuminemia.

18.  Questionable secondary hyperparathyroidism with elevated PTH.

19.  Borderline hyperuricemia.

20.  Proteinuria, microscopic hematuria, pyuria, bacteriuria.

21.  Cardiomegaly.

22.  Left upper chest pacemaker.

23.  Left insular cortex left frontal lobe hyperdense encephalomalacia

consistent with old infarct.

24.  Small vessel ischemic disease of the brain.

25.  Bilateral anterior temporal lobe medial aspect hypodensities.

26.  Ventriculomegaly with cerebral cortical atrophy of the brain.

27.  Atherosclerotic calcification of the cavernous internal carotid

artery.

28.  Ethmoid sinus mucosal thickening.

29.  Right sphenoid sinus air-fluid level.

30.  Gallbladder wall thickening without cholelithiasis.

31.  Bilateral renal pelviectasis with mild bilateral hydroureter.

32.  Rectal fecal stasis and constipation.

33.  Gastric wall thickening.

34.  Possible gastritis versus incomplete gastric distention.

35.  Urinary retention.

36.  Possible cystitis with urinary bladder wall thickening.

37.  Prostatomegaly.

38.  Right-sided hydrocele.

39.  Degenerative joint disease of the spine with L3-L4 anterolisthesis.

40.  Abdominal aortic atherosclerotic calcification and iliac artery and

femoral artery atherosclerotic changes.

41.  Abdominal and pelvic mesenteric stranding, probably secondary obtuse

inflammatory.

42.  Bibasilar ground-glass densities and atelectasis.

43.  Right apical atelectasis and parenchymal scarring.

44.  Pericardial fluid with questionable hemopericardium.

45.  Osteopenia.

46.  Hypertrophic degenerative joint disease of the spine with thoracic

spine kyphosis.

47.  Questionable lymphadenopathy or extension of the complex pericardial

fluid.

48.  Mediastinal lymph nodes.

49.  Left-sided pacemaker.

50.  Questionable cholelithiasis with common bile duct hetro-echogenicity.

51.  Degenerative joint disease of the bilateral hips.

52.  Degenerative joint disease of the knees with proliferative

hypertrophic changes of the femoral condyle and tibial plateau regions of

both knees.

53.  Bilateral 20%-39% internal carotid artery stenosis.

54.  Left frontal anterior parietal lobe cystic encephalomalacia.

55.  Chronic microangiopathic disease of the brain.

56.  Cerebral cortical atrophy of the brain with ventriculomegaly.

57.  Right maxillary sinus mucosal thickening.

58.  Congestive heart failure, etiology undetermined.

59.  Questionable choledocholithiasis.

60.  History of chronic atrial fibrillation, Coumadin dependent.

61.  Status post pacemaker versus automatic implantable

cardioverter-defibrillator implant.

62.  History of leukopenia and thrombocytopenia, history of anemia.

63.  History of peripancreatic adenopathy, etiology undetermined.

64.  History of proximal pancreatic body hypoattenuating mass.

65.  Questionable and possible abnormal peripancreatic lymphadenopathy with

periportal lymphadenopathy.

66.  History of acute versus subacute left frontal lobe infarct in 2013.

67.  History of dilated cardiomyopathy with ejection fraction of _____.

68.  Pulmonary arterial hypertension with elevated right ventricular

systolic pressure.

69.  History of moderately dilated left ventricle, concentric left

ventricle hypertrophy and severely impaired left ventricle systolic

function.

70.  Left ventricular global hypokinesis.

71.  History of grade-3 irreversible restrictive diastolic dysfunction.

72.  History of mildly reduced right ventricular systolic function.

73.  History of permanent pacemaker implant.

74.  Moderate aortic regurgitation.

75.  Mild mitral regurgitation.

76.  Moderate tricuspid regurgitation.

77.  Deconditioning.

78.  History of nonobstructive coronary artery disease.

79.  History of cardiomyopathy with history of ventricular tachycardia.

80.  History of noncompliance.

81.  History of esophagogastroduodenoscopy and endoscopic ultrasonography

with fine-needle aspiration of the peripancreatic and pancreatic body mass.

82.  History of non-ST elevation myocardial infarction.

83.  History of third-degree heart block, history of syncope, history of

automatic implantable cardioverter-defibrillator implant.



PLAN:  At this time, the patient is to be continued on gentle IV fluid

hydration.  The patient is to be continued on dobutamine drip at 5

mcg/kg/minute.  The patient home medication including Coumadin and Zestril.

The patient's Coumadin 3 mg daily, Zocor 40 mg daily, Zestril 2.5 mg daily

is held at this time.  The patient has been ordered serial labs.



CURRENT CONSULTATION:

1.  Cardiology.

2.  Neurology.

3.  The patient is also requested to be evaluated by Gastroenterology.



The patient has also been ordered HIDA scan for evaluation of possible the

choledocholithiasis.  At present, the patient is to be continued on above

therapeutic intervention.  The patient will be ordered physical therapy,

ambulation therapy, gait training.  The patient has been ordered out of bed

to chair.  The patient has been ordered physical therapy and occupational

therapy.  The patient has been ordered SCDs.  The patient's case will be

referred to TCU.



The patient's further management will be dependent upon the patient's

clinical condition, hemodynamic status and as per the patient response to

therapeutic intervention as per the patient's diagnostic test results and

as per recommendation by all the physician involved in the care of the

patient.



The patient was also updated about his condition, need for further

diagnostic therapeutic intervention, need for further diagnostic testing

was explained to the patient in layman's language.  All questions concerned

answered.



Dictated and electronically signed, not read.





__________________________________________

Phong Cordon MD



DD:  01/25/2018 14:49:22

DT:  01/25/2018 15:13:53

Job # [id number]

[email or web address]

## 2018-01-29 LAB
ALBUMIN SERPL-MCNC: 2.6 G/DL (ref 3–4.8)
ALBUMIN/GLOB SERPL: 0.8 {RATIO} (ref 1.1–1.8)
ALT SERPL-CCNC: 37 U/L (ref 7–56)
AST SERPL-CCNC: 61 U/L (ref 17–59)
BASOPHILS # BLD AUTO: 0.01 K/MM3 (ref 0–2)
BASOPHILS NFR BLD: 0.2 % (ref 0–3)
BILIRUB DIRECT SERPL-MCNC: 0.3 MG/DL (ref 0–0.4)
CK MB SERPL-MCNC: 2.4 NG/ML (ref 0–3.6)
EOSINOPHIL # BLD: 0.2 10*3/UL (ref 0–0.7)
EOSINOPHIL NFR BLD: 4.6 % (ref 1.5–5)
ERYTHROCYTE [DISTWIDTH] IN BLOOD BY AUTOMATED COUNT: 13.6 % (ref 11.5–14.5)
GRANULOCYTES # BLD: 2.57 10*3/UL (ref 1.4–6.5)
GRANULOCYTES NFR BLD: 56.5 % (ref 50–68)
HGB BLD-MCNC: 10.4 G/DL (ref 14–18)
INR PPP: 1.37 (ref 0.93–1.08)
LYMPHOCYTES # BLD: 1.2 10*3/UL (ref 1.2–3.4)
LYMPHOCYTES NFR BLD AUTO: 26.4 % (ref 22–35)
MAGNESIUM SERPL-MCNC: 2 MG/DL (ref 1.7–2.2)
MCH RBC QN AUTO: 29.9 PG (ref 25–35)
MCHC RBC AUTO-ENTMCNC: 33 G/DL (ref 31–37)
MCV RBC AUTO: 90.5 FL (ref 80–105)
MONOCYTES # BLD AUTO: 0.6 10*3/UL (ref 0.1–0.6)
MONOCYTES NFR BLD: 12.3 % (ref 1–6)
PLATELET # BLD: 83 10^3/UL (ref 120–450)
PLATELET COUNT MANUAL: 95 K/MM3 (ref 120–450)
PMV BLD AUTO: 13.1 FL (ref 7–11)
PROTHROMBIN TIME: 15.9 SECONDS (ref 9.4–12.5)
RBC # BLD AUTO: 3.48 10^6/UL (ref 3.5–6.1)
TROPONIN I SERPL-MCNC: 0.03 NG/ML
URATE SERPL-MCNC: 3.3 MG/DL (ref 3.5–8.5)
WBC # BLD AUTO: 4.6 10^3/UL (ref 4.5–11)

## 2018-01-29 PROCEDURE — B215YZZ FLUOROSCOPY OF LEFT HEART USING OTHER CONTRAST: ICD-10-PCS | Performed by: INTERNAL MEDICINE

## 2018-01-29 PROCEDURE — B211YZZ FLUOROSCOPY OF MULTIPLE CORONARY ARTERIES USING OTHER CONTRAST: ICD-10-PCS | Performed by: INTERNAL MEDICINE

## 2018-01-29 PROCEDURE — 4A023N7 MEASUREMENT OF CARDIAC SAMPLING AND PRESSURE, LEFT HEART, PERCUTANEOUS APPROACH: ICD-10-PCS | Performed by: INTERNAL MEDICINE

## 2018-01-29 RX ADMIN — DOBUTAMINE HYDROCHLORIDE PRN MLS/HR: 200 INJECTION INTRAVENOUS at 06:22

## 2018-01-29 RX ADMIN — PANTOPRAZOLE SODIUM SCH MG: 40 TABLET, DELAYED RELEASE ORAL at 06:22

## 2018-01-29 NOTE — CON
DATE:  01/29/2018



REQUESTING PHYSICIAN:  Dr. Cordon.



REASON FOR CONSULTATION:  I have been asked to see this 89-year-old male

who apparently fell at home and was not discovered for approximately 24

hours for elevated AST.  Upon admission to the hospital, the patient was

found to have an elevated CK of 771 and an elevated AST of 105.  The ALT,

alk phos, and total bilirubin were all normal.  His troponin was elevated

at 0.05.  He denies any chest pain, shortness of breath, abdominal pain,

nausea, vomiting, or rectal bleeding.  There is no history of syncope.  The

patient does have a history of dilated cardiomyopathy with an ejection

fraction of under 20%.  He also has a history of permanent pacemaker.



PAST MEDICAL HISTORY:  As above.  Again, he has a history of dilated

cardiomyopathy.



PAST SURGICAL HISTORY:  Notable for permanent pacemaker placement.



SOCIAL HISTORY:  Denies cigarette smoking or alcohol use.



FAMILY HISTORY:  Noncontributory.



REVIEW OF SYSTEMS:  A 14 point review of systems is notable for weakness

and fall at home.



PHYSICAL EXAMINATION:

GENERAL:  Pleasant elderly male lying in bed in no acute distress.

VITAL SIGNS:  Reveal temperature of 98.7, blood pressure 120/61, heart rate

72.

HEENT:  Reveal sclerae to be white.  Conjunctivae pink.

NECK:  Supple.

CHEST:  Reveal lungs to be clear.

HEART:  Exam reveals regular rate and rhythm.

ABDOMEN:  Soft, nontender, no mass.

EXTREMITIES:  Show no edema.  He does have a permanent pacemaker under his

left clavicle.



LABORATORY DATA:  Reveal chloride of 109, BUN 11, creatinine 0.8.  AST is

trending downward to 61.  CK is trending downward to 278.  CBC reveals

white blood cell count 4.6, hemoglobin 10.4, platelet count of 83,000.



IMPRESSION:  An 89-year-old male fell at home who remained on the ground

for about 24 hours and found to have rhabdomyolysis with elevated CPK.  His

elevated AST which is trending downward is secondary to rhabdomyolysis. 

His ALT, alkaline phosphatase were all normal.  He does have a history of

dilated cardiomyopathy with an ejection fraction of under 20% with a

permanent pacemaker.  He is anemic and found to have a low platelet count

without any overt signs gastrointestinal bleeding.



RECOMMENDATIONS:  Given poor overall health and his poor ejection fraction,

we will treat the patient conservatively in terms of his anemia.  No

further GI workup is planned.  Again, his elevated AST is secondary to

rhabdomyolysis.  No further workup is needed for this.





__________________________________________

Lee German MD



DD:  01/29/2018 12:07:25

DT:  01/29/2018 12:21:09

Job # [id number]

## 2018-01-29 NOTE — CARDCATH
PROCEDURE DATE:  01/29/2018



HISTORY:  The patient is an 89-year-old male who was found after a fall.



His past medical history includes dilated cardiomyopathy.  He was found to

have a low cardiac output syndrome with elevated BUN and creatinine as well

as weakness.



He also complains of shortness of breath.  He was placed on IV dobutamine

with excellent result with normalization of his BUN and creatinine as well

as much symptomatic relief.  Because of his incomplete workup for his poor

LV function, the patient was brought for cardiac catheterization given his

borderline elevated troponins.



PROCEDURE:  Left heart catheterization with coronary arteriography, left

ventriculogram, as well as supra-aortic valvular injection were performed. 

There were no complications.



I performed moderate sedation which included the presence of an independent

trained observer that assisted in monitoring the patient's level of

consciousness and physiologic status.



Through administration of Versed and fentanyl, my intra-service time was 15

minutes.



The findings on catheterization revealed a left ventricle that was markedly

dilated and diffusely hypokinetic.  Estimated ejection fraction is 20%.



Supra-aortic valvular injection revealed 1+ aortic insufficiency.



His coronary anatomy revealed a right dominant circulation.  The RCA

revealed diffuse atherosclerosis without critical lesions.



Left main artery was unremarkable.



The LAD revealed diffuse atherosclerosis with a 50% stenosis in the

midportion.



The diagonal vessels revealed diffuse atherosclerosis without critical

lesions.



The circumflex artery and obtuse marginal branches were free of significant

disease.



Given these findings, the patient's findings revealed severe dilated

cardiomyopathy with an EF of approximately 20%.



Single-vessel CAD with a 50% mid LAD lesion.



1+ aortic insufficiency.



Given these findings, the patient's treatment should be continued medical

therapy.  This should include aspirin, statin therapy, an ACE inhibitor and

low-dose digoxin.





__________________________________________

Humberto Berry MD



DD:  01/29/2018 12:45:07

DT:  01/29/2018 12:48:32

Job # [id number]

## 2018-01-29 NOTE — CP.PCM.PN
Subjective





- Date & Time of Evaluation


Date of Evaluation: 01/29/18


Time of Evaluation: 11:03





- Subjective


Subjective: 


Medicine Progress Note:





Patient seen and assessed at bedside in cath waiting area. No acute events 

noted overnight by nursing staff or patient. Patient is alert and oriented to 

person and place but not to time and event. Patient to have cardiac cath with 

Dr. Berry today. He offers no complaints at this time, including fever, chills, 

headache, chest pain, SOB, abdominal pain, or any pain with urination.





Objective





- Vital Signs/Intake and Output


Vital Signs (last 24 hours): 


 











Temp Pulse Resp BP Pulse Ox


 


 98.7 F   72   20   120/61   92 L


 


 01/29/18 06:00  01/29/18 10:00  01/29/18 06:00  01/29/18 06:22  01/29/18 06:00








Intake and Output: 


 











 01/29/18 01/29/18





 06:59 18:59


 


Intake Total 120 


 


Output Total 1550 


 


Balance -1430 














- Medications


Medications: 


 Current Medications





Aspirin (Aspirin Chewable)  81 mg PO DAILY [facility], [facility]


   Last Admin: 01/29/18 09:22 Dose:  Not Given


Clopidogrel Bisulfate (Plavix)  75 mg PO DAILY [facility]


   Last Admin: 01/29/18 09:23 Dose:  Not Given


Dobutamine HCl/Dextrose (Dobutamine/Dextrose 5% 500mg/250ml)  500 mg in 250 mls 

@ 9.594 mls/hr IV .Q24H PRN; 5 MCG/KG/MIN


   PRN Reason: Shortness of Breath


   Last Admin: 01/29/18 06:22 Dose:  9.594 mls/hr


Sodium Chloride (Sodium Chloride 0.9%)  1,000 mls @ 75 mls/hr IV .[id number] [facility]


   Last Admin: 01/29/18 02:34 Dose:  75 mls/hr


Magnesium Sulfate 2 gm/ Sodium (Chloride)  104 mls @ 102 mls/hr IVPB ONCE ONE


   Stop: 01/29/18 11:19


Potassium Phosphate 15 mmole/ (Dextrose)  255 mls @ 42.5 mls/hr IVPB ONCE ONE


   Stop: 01/29/18 16:17


Pantoprazole Sodium (Protonix Ec Tab)  40 mg PO 0600,1600 [facility]


   Last Admin: 01/29/18 06:22 Dose:  40 mg











- Labs


Labs: 


 





 01/29/18 06:30 





 01/28/18 07:30 





 











PT  15.9 SECONDS (9.4-12.5)  H  01/29/18  09:00    


 


INR  1.37  (0.93-1.08)  H  01/29/18  09:00    


 


APTT  30.7 Seconds (25.1-36.5)   01/28/18  07:30    














- Constitutional


Appears: Non-toxic, No Acute Distress





- Head Exam


Head Exam: ATRAUMATIC, NORMAL INSPECTION, NORMOCEPHALIC





- Eye Exam


Eye Exam: EOMI, Normal appearance, PERRL





- ENT Exam


ENT Exam: Mucous Membranes Moist, Normal Exam





- Neck Exam


Neck Exam: Full ROM, Normal Inspection.  absent: Lymphadenopathy





- Respiratory Exam


Respiratory Exam: Clear to Ausculation Bilateral, NORMAL BREATHING PATTERN.  

absent: Rales, Rhonchi, Wheezes





- Cardiovascular Exam


Cardiovascular Exam: REGULAR RHYTHM, RRR, +S1, +S2





- GI/Abdominal Exam


GI & Abdominal Exam: Soft, Normal Bowel Sounds





- Extremities Exam


Extremities Exam: Full ROM, Normal Capillary Refill, Pedal Edema (+1 

bilaterally to lower calf).  absent: Calf Tenderness, Joint Swelling, Normal 

Inspection, Tenderness





- Back Exam


Back Exam: NORMAL INSPECTION





- Neurological Exam


Neurological Exam: Alert, Awake, CN II-XII Intact.  absent: Oriented x3





- Psychiatric Exam


Psychiatric exam: Normal Affect, Normal Mood





- Skin


Skin Exam: Dry, Intact, Normal Color, Warm





Assessment and Plan





- Assessment and Plan (Free Text)


Assessment: 


89 year old male with a past medical history significant for previous CVA with 

residual LLE weakness, CAD, cardiomyopathy w/ AICD, Atrial Fibrillation (on 

coumadin), and PVD who came in status post fall of unknown etiology. He was 

found to have transaminitis and hyperbilirubinemia and an Abdominal U/S showed 

his CBD was dilated at 6mm with possible stone. HIDA currently pending. His 

renal function was poor upon arrival, likely secondary to rhabdomyolysis, but 

has since improved. Initial troponin was elevated at 0.13 but serial troponins 

since have all been negative and downtrending. Patient is on dobutamine to 

support heart function.


Plan: 


1. Syncope s/p Fall


-Two Head CT's have shown no acute intracranial hemorrhages and left frontal/

parietal cystic encephalomalacia


-Chest X-Ray showed cardiomegaly with no active pulmonary disease


-Carotid Doppler revealed 20-39% stenosis of bilateral proximal ICA's


-Echo showed severe systolic dysfunction and an LVEF of 17.9%


-Spine, Knee and Hip X-Rays are all unremarkable


-Initial troponin elevated at 0.13 but has since trended downwards with all 

repeat serial troponins negative


-Orthostatic Vital Signs and [place] negative


-Patient to undergo cardiac cath with Dr. Berry today


-Continue Dobutamine drip for enhanced cardiac function to minimize cerebral 

hypoperfusion


-Continue Normal Saline at 75mls/hr


-Continue PT/OT, all recommendations appreciated


-Cardiology and Neurology consulted, all recommendations appreciated


 


2. Elevated Liver Enzymes


-CT Abdomen/Pelvis showed thickening of the gallbladder wall without discrete 

cholelithiasis


-Abdominal U/S showed tiny focus of hyperechogenicity within the CBD with CBD 

measuring 6mm


-HIDA Scan pending


-Liver Enzymes and Bilirubin trending downwards since admission


-Holding home Simvastatin until this issue is resolved


-GI consulted, all recommendations appreciated





3. History of CAD


-Continue ASA and Plavix





4. History of Atrial Fibrillation


-Holding home Coumadin


-Daily INR





GI Prophylaxis: Protonix


DVT Prophylaxis: SCD's





Patient seen and case discussed with attending, Dr. Cordon.

## 2018-01-30 ENCOUNTER — HOSPITAL ENCOUNTER (INPATIENT)
Dept: HOSPITAL 42 - TRCU | Age: 83
LOS: 8 days | Discharge: HOME HEALTH | DRG: 92 | End: 2018-02-07
Attending: INTERNAL MEDICINE | Admitting: INTERNAL MEDICINE
Payer: COMMERCIAL

## 2018-01-30 VITALS — HEART RATE: 61 BPM | TEMPERATURE: 98.2 F | DIASTOLIC BLOOD PRESSURE: 50 MMHG | SYSTOLIC BLOOD PRESSURE: 90 MMHG

## 2018-01-30 VITALS — OXYGEN SATURATION: 95 % | RESPIRATION RATE: 18 BRPM

## 2018-01-30 VITALS — BODY MASS INDEX: 21.2 KG/M2

## 2018-01-30 DIAGNOSIS — N40.1: ICD-10-CM

## 2018-01-30 DIAGNOSIS — R31.29: ICD-10-CM

## 2018-01-30 DIAGNOSIS — Z95.0: ICD-10-CM
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
## 2018-01-30 DIAGNOSIS — I42.0: ICD-10-CM

## 2018-01-30 DIAGNOSIS — E83.42: ICD-10-CM

## 2018-01-30 DIAGNOSIS — I11.0: ICD-10-CM

## 2018-01-30 DIAGNOSIS — D64.9: ICD-10-CM

## 2018-01-30 DIAGNOSIS — R33.8: ICD-10-CM

## 2018-01-30 DIAGNOSIS — T83.511A: ICD-10-CM

## 2018-01-30 DIAGNOSIS — E87.5: ICD-10-CM

## 2018-01-30 DIAGNOSIS — I25.5: ICD-10-CM

## 2018-01-30 DIAGNOSIS — I50.20: ICD-10-CM

## 2018-01-30 DIAGNOSIS — F03.90: ICD-10-CM

## 2018-01-30 DIAGNOSIS — N32.0: ICD-10-CM

## 2018-01-30 DIAGNOSIS — Z79.01: ICD-10-CM

## 2018-01-30 DIAGNOSIS — I25.118: ICD-10-CM

## 2018-01-30 DIAGNOSIS — I48.91: ICD-10-CM

## 2018-01-30 DIAGNOSIS — E78.5: ICD-10-CM

## 2018-01-30 DIAGNOSIS — K59.00: ICD-10-CM

## 2018-01-30 DIAGNOSIS — N39.0: ICD-10-CM

## 2018-01-30 DIAGNOSIS — R26.9: Primary | ICD-10-CM

## 2018-01-30 DIAGNOSIS — Z86.73: ICD-10-CM

## 2018-01-30 DIAGNOSIS — M62.82: ICD-10-CM

## 2018-01-30 NOTE — DS
PROGRESS NOTE AND DISCHARGE SUMMARY



HISTORY OF PRESENT ILLNESS:  The patient is seen in room 273, bed 1.  The

patient is seen lying in the bed.  The patient is comfortable.  Overnight,

no adverse events were noted.  The patient slept well overnight.  The

patient was found to be alert, awake, oriented x3.  Right groin site showed

no hematoma, no swelling, positive pulses.  The patient denies chest pain. 

Denies shortness of breath.  Denies nausea, vomiting, diarrhea or

constipation.  Denies hemoptysis.  Denies fall.



PHYSICAL EXAMINATION:

VITAL SIGNS:  T-max 98.8, pulse 62, 68, 63, blood pressure 112/56,

respiration 18, O2 sat 95%.  Telemetry monitoring shows paced rhythm.

INTAKE AND OUTPUT:  Intake is 850, output is 1500.  Yesterday's input 120,

output 1550.

HEENT:  Head examination normocephalic, atraumatic.  Shows pinkish

conjunctivae.  Anicteric sclerae.  No oropharyngeal lesion.  No neck

rigidity.  Soft carotid bruit.  No visible jugular venous distention noted.

CHEST:  Positive kyphosis noted..  No rales, crackles or wheezing noted.

CARDIOVASCULAR:  S1, S2, regular rhythm.  Positive systolic murmur left

sternal border, left second intercostal space.

ABDOMEN:  Soft.  Positive bowel sounds.  No hepatosplenomegaly noted.  No

guarding.  No rigidity, rebound tenderness.

GENITALIA:  Male.

RECTAL:  Deferred.

EXTREMITIES:  Right groin examination is intact.

VASCULAR:  Palpable pulses.

NEUROLOGICAL:  The patient is alert, awake and oriented x3.  Cranial nerves

II through XII intact.



LABORATORY DATA:  None from today.



FINAL IMPRESSION, PLAN AND DISCHARGE DIAGNOSES:

1.  Syncope etiology undetermined.

2.  Status post fall.

3.  History of hypertension.

4.  Atrial fibrillation.

5.  History of cerebral infarct.

6.  Rhabdomyolysis.

7.  Acute renal failure (resolved).

8.  Gait dysfunction.

9.  History of cerebral infarct.

10.  Leukopenia.

11.  Granulocytosis.

12.  Normocytic anemia.

13.  Coumadin dependent atrial fibrillation.

14.  Acute renal failure and acute kidney injury.

15.  Hypophosphatemia, hypomagnesemia.

16.  Transaminitis.

17.  Rhabdomyolysis with elevated CPK.

18.  Systolic congestive heart failure with elevated BNP.

19.  Protein malnutrition.

20.  Hypoalbuminemia.

21.  Secondary hyperparathyroidism with elevated PTH.

22.  Questionable acute non-ST elevation myocardial infarction with

elevated troponin.

23.  Proteinuria, microscopic hematuria, bacteriuria.

24.  Status post cardiac catheterization.

25.  Marked dilated and diffusely hypokinetic left ventricle with left

ventricle ejection fraction of 20%.

26.  1+ aortic regurgitation.

27.  Diffuse atherosclerosis of the right coronary artery.

28.  Diffuse atherosclerosis of the left anterior descending artery with

50% stenosis of the mid left anterior descending artery.

29.  Diffuse atherosclerosis of the diagonal coronary artery.

30.  Single-vessel coronary artery disease with 50% mid left anterior

descending artery lesion.

31.  Urinary retention.

32.  Prostatic hypertrophy.

33.  Hypovitaminosis D.



PLAN:  At this time, the patient has been cleared for discharge by Dr. Humberto Berry.  The patient will be discharge to TCU if accepted or otherwise

discharged home with home PT and VNA.  The patient is to follow up with Dr. Cordon within 1 week.



DISCHARGE MEDICATIONS:  If patient is discharged home are as follows;

1.  Tylenol 650 q. 6 p.r.n.

2.  Aspirin 81 mg daily.

3.  Digoxin 0.125 mg daily.

4.  Drisdol 50,000 units weekly.

5.  Zestril 2.5 mg daily.

6.  Flomax 0.4 mg daily.

7.  Coumadin to be resumed at 3 mg daily.



At present, if the patient is accepted to TCU, then patient will be

discharged to TCU, otherwise the patient will be discharged home.



Dictated and electronically signed, not read.







__________________________________________

Phong Cordon MD





DD:  01/30/2018 11:03:34

DT:  01/30/2018 11:10:35

Job # [id number]

## 2018-01-30 NOTE — PN
DATE:  01/30/2018



CARDIOLOGY FOLLOWUP



SUBJECTIVE:  The patient is stable post cardiac catheterization.



There are no critical coronary lesions.



PHYSICAL EXAMINATION:

VITAL SIGNS:  Blood pressure is 95/53 with the heart rates in the 70s.

NECK:  Negative JVD.

LUNGS:  Without rales.

HEART:  With S1, S2.

EXTREMITIES:  Without edema.



LABORATORY DATA:  Laboratories were not drawn today.



IMPRESSION:

1.  Stable post cardiac catheterization.

2.  Chronic atrial fibrillation.

3.  End-stage dilated cardiomyopathy.

4.  Congestive heart failure, which is better.

5.  Status post fall.

6.  Given these findings, the patient needs to be restarted on his Coumadin

with low-dose aspirin, lisinopril and low-dose digoxin.



__________________________________________

Humberto Berry MD





DD:  01/30/2018 13:04:03

DT:  01/30/2018 13:07:40

Job # [id number]

## 2018-01-30 NOTE — PN
DATE:  01/29/2018



SUBJECTIVE:  The patient is seen in the holding area of the cardiac

catheterization.  The patient is awaiting cardiac catheterization today by

Dr. Berry.  The patient appears to be completely alert, awake, responsive. 

Oriented to person, place and time.  The patient is seen lying in the

stretcher.  Patient is comfortable.



PHYSICAL EXAMINATION:

VITAL SIGNS:  T-max 98.8.  Heart rate is 68, 64, 65, 71, 63, blood pressure

in the last 24 hours 132/77, 120/61, 116/64, 111/61, respiration is 18-21. 

O2 sat is 94%, 95%, 98%, 96%.  Intake and output, intake 480 yesterday,

output 700.  Today's intake 120, output 1550.

HEENT:  The patient's head examination normocephalic, atraumatic.  HEENT

examination shows pinkish conjunctivae.  Anicteric sclerae.  No

oropharyngeal lesion.

NECK:  No neck rigidity.  No visible jugular venous distention.  No audible

carotid bruit.

Chest:  Examination is kyphosis.  Positive left upper chest pacemaker

noted.

CARDIOVASCULAR:  Shows S1, S2, regular rhythm.  Positive systolic murmur,

left sternal border, left second intercostal space, right second

intercostal space.

ABDOMEN:  Soft.  Positive bowel sounds.

GENITALIA:  Male.  Positive Calle catheter noted.  Draining clear urine.

MUSCULOSKELETAL:  Examination shows a body mass index of 21.5.

NEUROLOGIC:  The patient is alert, awake, responsive, is able to move upper

and lower extremities without assistance.  The patient is alert, awake,

oriented x3.  Motor strength is 5/5.  Gait examination is not tested.



Telemetry shows paced rhythm.



DIAGNOSTICS:  On 01/29/2018, WBC 4.6, hemoglobin/hematocrit 10.4 and 31.5,

platelet 95,000.  PT 15.9, INR 1.37.  Sodium 138, potassium 3.7, chloride

109, CO2 of 25, anion gap 9, BUN 11, creatinine 0.8.  GFR greater than 60,

glucose 86, lactic acid is negative.  Uric acid 3.3, calcium 2.4.  LDH is

down to 823.  CPK is down to 278.  BNP is 8390, total protein 5.7, albumin

2.6.  Urinalysis noted.  Blood and urine cultures negative.  The patient's

cardiac catheterization results were noted.  Echocardiogram results from

01/25 reviewed,.



IMPRESSION AND PLAN:

1.  Status post fall.

2.  Questionable syncope.

3.  Severe symptomatic rhabdomyolysis.

4.  Acute renal failure (resolving.

5.  History of hypertension.

6.  Gait dysfunction.

7.  Transient hypoxemia.

8.  Leukopenia, anemia, thrombocytopenia, and pancytopenia.

9.  Coumadin dependent atrial fibrillation.

10.  Hypophosphatemia.

11.  Hypomagnesemia.

12.  Transaminitis.

13.  Elevated LDH.

14.  Rhabdomyolysis with elevated CPK.

15.  Acute recurrent systolic congestive heart failure with elevated BNP.

16.  Protein malnutrition and hypoalbuminemia.

17.  Possible non-ST-elevation myocardial infarction with elevated

troponin.

18.  Status post cardiac catheterization.

19.  Lactic acidosis.

20.  Gait dysfunction.

21.  Deconditioning.

22.  Questionable urinary retention.

23.  Proteinuria, hematuria, bacteriuria.

24..  Left ventricular ejection fraction of 18% with dilated left

ventricle, concentric left ventricular hypertrophy and severely impaired

left ventricular function.

25.  Grade 1 abnormal relaxation pattern.

26.  Permanent pacemaker implant.

27.  Moderately thickened aortic valve with mild aortic regurgitation.

28.  Moderately thickened mitral valve with mild mitral regurgitation.

29.  Tricuspid regurgitation with mild pulmonary hypertension with right

ventricular systolic pressure of 42 mmHg.

30.  Mildly enlarged aortic root.

31.  End-stage dilated cardiomyopathy with left ventricular ejection

fraction of 18%.

32.  Low cardiac output syndrome.

33.  Markedly dilated and diffusely hypokinetic left ventricle with

ejection fraction of 20%.

34.  1+ aortic regurgitation.

35.  Diffuse atherosclerosis of the right coronary artery without stenosis.

36.  50% stenosis of the mid left anterior descending artery.

37.  Diffuse atherosclerosis of the diagonal vessels.

38.  Single-vessel coronary artery disease with 50% stenosis of the mid

left anterior descending artery.

39.  Left frontal and left anterior parietal lobe encephalomalacia with

chronic microangiopathic disease.

40.  Non-ST-elevation myocardial infarction with elevated troponin.

41.  Mild residual left lower extremity weakness.

42.  Chronic Coumadin-dependent atrial fibrillation.

43.  Acute renal failure.

44.  Transient cerebral hypoperfusion secondary to dilated cardiomyopathy.

45.  Mechanical fall.

46.  Psychosis.



PLAN:  At this time, the patient was cleared by cardiologist for discharge

in a.m.  The patient was seen by Neurology, Cardiology, Gastroenterology.



CURRENT MEDICATIONS:

1.  Aspirin 81 daily.

2.  Digoxin 0.125 mg daily.

3.  The patient was given magnesium sulfate rider.

4.  The patient was started on Zestril 2.5 daily.



The patient is on heart healthy diet, out of bed, KARI stockings, SCDs,

physical therapy, occupational therapy, ambulation therapy ordered.  The

patient's case will be referred to Physical Therapy and  for

TCU evaluation.  The patient is seen by Physical Therapy.  Their

recommendation is to consider TCU.  The patient will be considered for

discharge to TCU if accepted.  Otherwise, the patient will be considered

for discharge home with [facility] home [facility] aid and home PT.  The patient's

current discharge medications will be Tylenol 650 q. 6 p.r.n., aspirin 81

mg daily, digoxin 0.125 mg daily, Drisdol 50,000 units weekly, Zestril 2.5

mg daily, and Coumadin 3 mg daily.  At present, the patient is to be

continued on above therapeutic intervention.  The patient is also advised

close outpatient followup in the office for patient's management of his

medical condition and optimization of therapy.  The patient understood and

acknowledged all the details.  The patient was also explained about his

diagnosis and diagnostic test results and recommendation by all the

physicians involved in the care of the patient.



Dictated and electronically signed, not read.

Signing off





__________________________________________

Phong Cordon MD



DD:  01/29/2018 21:20:45

DT:  01/29/2018 21:31:56

Job # [id number]

## 2018-01-30 NOTE — PN
DATE:  01/30/2018



SUBJECTIVE:  The patient is lying in bed, comfortable.  He denies any

abdominal pain, chest pain or shortness of breath.  The patient had cardiac

catheterization yesterday which showed single-vessel coronary artery

disease with a 50% mid-LAD lesion.  There is mild aortic insufficiency, the

ejection fraction was estimated to be 20%.



PHYSICAL EXAMINATION:

VITAL SIGNS:  Reveal temperature of 98, blood pressure 95/53, heart rate of

73.

HEENT:  Reveal sclerae to be white.  Conjunctivae pale.

NECK:  Supple.

CHEST:  Lungs are clear.

HEART:  Reveals a regular rate and rhythm.

ABDOMEN:  Soft, nontender.  No mass.

EXTREMITIES:  Show no edema.



LABORATORY DATA:  No new laboratory data are available.



IMPRESSION:

1.  Rhabdomyolysis.

2.  Elevated AST secondary to rhabdomyolysis.

3.  Single-vessel coronary artery disease with dilated cardiomyopathy.



RECOMMENDATIONS:  No further GI workup planned.  The patient is to be

discharged home on aspirin, Lanoxin and Zestril.







__________________________________________

Lee German MD





DD:  01/30/2018 14:18:50

DT:  01/30/2018 14:21:51

Job # [id number]

## 2018-01-31 LAB
ALBUMIN SERPL-MCNC: 2.8 G/DL (ref 3–4.8)
ALBUMIN/GLOB SERPL: 0.9 {RATIO} (ref 1.1–1.8)
ALT SERPL-CCNC: 40 U/L (ref 7–56)
APTT BLD: 29 SECONDS (ref 25.1–36.5)
AST SERPL-CCNC: 40 U/L (ref 17–59)
BASOPHILS # BLD AUTO: 0.01 K/MM3 (ref 0–2)
BASOPHILS NFR BLD: 0.2 % (ref 0–3)
BILIRUB DIRECT SERPL-MCNC: 0.3 MG/DL (ref 0–0.4)
BUN SERPL-MCNC: 18 MG/DL (ref 7–21)
CALCIUM SERPL-MCNC: 9 MG/DL (ref 8.4–10.5)
EOSINOPHIL # BLD: 0.2 10*3/UL (ref 0–0.7)
EOSINOPHIL NFR BLD: 3.9 % (ref 1.5–5)
ERYTHROCYTE [DISTWIDTH] IN BLOOD BY AUTOMATED COUNT: 13.5 % (ref 11.5–14.5)
GFR NON-AFRICAN AMERICAN: > 60
GRANULOCYTES # BLD: 2.77 10*3/UL (ref 1.4–6.5)
GRANULOCYTES NFR BLD: 56.5 % (ref 50–68)
HGB BLD-MCNC: 10.6 G/DL (ref 14–18)
INR PPP: 1.28 (ref 0.93–1.08)
LYMPHOCYTES # BLD: 1.1 10*3/UL (ref 1.2–3.4)
LYMPHOCYTES NFR BLD AUTO: 21.4 % (ref 22–35)
MAGNESIUM SERPL-MCNC: 1.6 MG/DL (ref 1.7–2.2)
MCH RBC QN AUTO: 29.4 PG (ref 25–35)
MCHC RBC AUTO-ENTMCNC: 32.1 G/DL (ref 31–37)
MCV RBC AUTO: 91.4 FL (ref 80–105)
MONOCYTES # BLD AUTO: 0.9 10*3/UL (ref 0.1–0.6)
MONOCYTES NFR BLD: 18 % (ref 1–6)
PLATELET # BLD: 120 10^3/UL (ref 120–450)
PMV BLD AUTO: 12.3 FL (ref 7–11)
PROTHROMBIN TIME: 14.8 SECONDS (ref 9.4–12.5)
RBC # BLD AUTO: 3.61 10^6/UL (ref 3.5–6.1)
WBC # BLD AUTO: 4.9 10^3/UL (ref 4.5–11)

## 2018-01-31 RX ADMIN — PANTOPRAZOLE SODIUM SCH MG: 20 TABLET, DELAYED RELEASE ORAL at 17:59

## 2018-01-31 RX ADMIN — DIGOXIN SCH MG: 0.12 TABLET ORAL at 13:53

## 2018-01-31 NOTE — CP.PCM.HP
History of Present Illness





- History of Present Illness


History of Present Illness: 


Mr. Clayton is an 89 year old male with a past medical history significant for 

CVA, HTN, HLD and Atrial Fibrillation who originally presented after a syncopal 

episode and associated fall. He was treated for rhabdomyolysis and completed a 

full workup for syncope. He is now in the TCU for continued strengthening and 

physical rehabilitation. There were no acute events overnight and the patient 

reports that he is feeling "very well". He is alert and oriented to person and 

place but not to time. He denies any complaints at this time including fever, 

chills, headache, chest pain, SOB, abdominal pain, N/V/D/C, urinary symptoms, 

skin changes or any numbness/tingling/weakness of any extremity.





PMH: CVA, HTN, HLD and Atrial Fibrillation


PSH: Pacemaker Placement


Family History: Non-Contributory


Social History: Denies tobacco, alcohol or illicit drug use; Retired baker


Allergies: NKDA


Home Medications: As per MAR





PMD: Dr. Cordon





Present on Admission





- Present on Admission


Any Indicators Present on Admission: No





Review of Systems





- Review of Systems


Review of Systems: 


As stated in HPI, otherwise negative





Past Patient History





- Infectious Disease


Hx of Infectious Diseases: None





- Tetanus Immunizations


Tetanus Immunization: Unknown





- Past Social History


Smoking Status: Never Smoked





- CARDIAC


Hx Cardiac Disorders: Yes


Hx Congestive Heart Failure: Yes


Hx Hypercholesterolemia: Yes


Hx Hypertension: Yes





- NEUROLOGICAL


HX Cerebrovascular Accident: Yes





- HEMATOLOGICAL/ONCOLOGICAL


Hx Blood Transfusions: No


Hx Blood Transfusion Reaction: No





- MUSCULOSKELETAL/RHEUMATOLOGICAL


Hx Falls: Yes





- GASTROINTESTINAL


Hx Gastrointestinal Disorders: No





- GENITOURINARY/GYNECOLOGICAL


Hx Genitourinary Disorders: Yes (urine retention)


Hx Reproductive Disorders: Yes (BPH)





- PSYCHIATRIC


Hx Substance Use: No





- SURGICAL HISTORY


Hx Surgeries: Yes





- ANESTHESIA


Hx Anesthesia Reactions: No


Hx Malignant Hyperthermia: No





Meds


Allergies/Adverse Reactions: 


 Allergies











Allergy/AdvReac Type Severity Reaction Status Date / Time


 


No Known Allergies Allergy   Verified 07/02/16 14:25














Physical Exam





- Constitutional


Appears: Non-toxic, No Acute Distress





- Head Exam


Head Exam: ATRAUMATIC, NORMAL INSPECTION, NORMOCEPHALIC





- Eye Exam


Eye Exam: EOMI, Normal appearance, PERRL


Pupil Exam: NORMAL ACCOMODATION, PERRL





- ENT Exam


ENT Exam: Mucous Membranes Moist, Normal Exam





- Neck Exam


Neck exam: Positive for: Full Rom, Normal Inspection.  Negative for: 

Lymphadenopathy





- Respiratory Exam


Respiratory Exam: Clear to Auscultation Bilateral, NORMAL BREATHING PATTERN.  

absent: Accessory Muscle Use, Chest Wall Tenderness, Decreased Breath Sounds, 

Prolonged Expiratory Phase, Rales, Rhonchi, Wheezes, Respiratory Distress, 

Stridor





- Cardiovascular Exam


Cardiovascular Exam: REGULAR RHYTHM, RRR, +S1, +S2





- GI/Abdominal Exam


GI & Abdominal Exam: Normal Bowel Sounds, Soft





- Extremities Exam


Extremities exam: Positive for: full ROM, normal capillary refill, pedal pulses 

present.  Negative for: calf tenderness, joint swelling, pedal edema, tenderness


Additional comments: 


Cardiac cath insertion site without clinical signs of infection or developing 

hematoma





- Back Exam


Back exam: NORMAL INSPECTION





- Neurological Exam


Neurological exam: Alert, CN II-XII Intact





- Psychiatric Exam


Psychiatric exam: Normal Affect, Normal Mood





- Skin


Skin Exam: Dry, Intact, Normal Color, Warm





Results





- Vital Signs


Recent Vital Signs: 





 Last Vital Signs











Temp  97.7 F   01/31/18 00:07


 


Pulse  65   01/31/18 00:07


 


Resp  18   01/31/18 00:07


 


BP  98/58 L  01/31/18 00:07


 


Pulse Ox  97   01/30/18 21:55














- Labs


Result Diagrams: 


 01/31/18 06:50





 01/31/18 06:50


Labs: 





 Laboratory Results - last 24 hr











  01/31/18





  06:50


 


WBC  4.9


 


RBC  3.61


 


Hgb  10.6 L


 


Hct  33.0 L


 


MCV  91.4


 


MCH  29.4


 


MCHC  32.1


 


RDW  13.5


 


Plt Count  120


 


MPV  12.3 H


 


Gran %  56.5


 


Lymph % (Auto)  21.4 L


 


Mono % (Auto)  18.0 H


 


Eos % (Auto)  3.9


 


Baso % (Auto)  0.2


 


Gran #  2.77


 


Lymph # (Auto)  1.1 L


 


Mono # (Auto)  0.9 H


 


Eos # (Auto)  0.2


 


Baso # (Auto)  0.01














Assessment & Plan





- Assessment and Plan (Free Text)


Assessment: 


89 year old male with a past medical history significant for previous CVA with 

residual LLE weakness, CAD, cardiomyopathy w/ AICD, Atrial Fibrillation (on 

coumadin), and PVD who came in status post fall of unknown etiology. His renal 

function was poor upon arrival, likely secondary to rhabdomyolysis, but has 

since improved. He was taken to cardiac cath after he was found to have 

elevated troponin but was found to have no critical stenosis. He was then 

transferred to the TCU for continued rehabilitation.


Plan: 


1. Syncope s/p Fall


-Cardiac cath revealed no critical stenosis and no interventions/stents were 

placed


-Two Head CT's have shown no acute intracranial hemorrhages and left frontal/

parietal cystic encephalomalacia


-Chest X-Ray showed cardiomegaly with no active pulmonary disease


-Carotid Doppler revealed 20-39% stenosis of bilateral proximal ICA's


-Echo showed severe systolic dysfunction and an LVEF of 17.9%


-Spine, Knee and Hip X-Rays are all unremarkable


-Initial troponin elevated at 0.13 but has since trended downwards with all 

repeat serial troponins negative


-Orthostatic Vital Signs and Wellington-Hallpike negative


-Continue PT/OT, all recommendations appreciated


-Cardiology consulted, all recommendations appreciated


 


2. Elevated Liver Enzymes (Resolved)


-CT Abdomen/Pelvis showed thickening of the gallbladder wall without discrete 

cholelithiasis


-Abdominal U/S showed tiny focus of hyperechogenicity within the CBD with CBD 

measuring 6mm


-Liver Enzymes and Bilirubin WNL


-Holding home Simvastatin until this issue is resolved


-GI consulted, all recommendations appreciated





3. History of CAD


-Continue ASA





4. History of Atrial Fibrillation


-Continue home Digoxin and Coumadin


-Daily INR





5. History of BPH


-Continue home Flomax


-Urology consulted, all recommendations appreciated





6. History of HTN


-Continue home Lisinopril





GI Prophylaxis: Protonix


DVT Prophylaxis: SCD's





Patient seen and case discussed with attending, Dr. Cordon.





- Date & Time


Date: 01/31/18


Time: 07:37

## 2018-01-31 NOTE — PN
DATE:  01/31/2018



SUBJECTIVE:  The patient was seen in [facility].  He is sitting in chair

comfortably.  Denies any chest pain, abdominal pain, dizziness, syncope or

falls.  He denies any nausea or vomiting.



OBJECTIVE:

VITAL SIGNS:  Reveal temperature of 97.7, blood pressure of 90/48, and

heart rate of 64.

HEENT:  Reveal sclerae to be white.  Conjunctivae pink.

NECK:  Supple.

CHEST:  Lungs are clear.

HEART:  Reveals regular rate and rhythm.

ABDOMEN:  Soft and nontender.

EXTREMITIES:  Show no edema.



LABORATORY DATA:  Reveal hemoglobin of 10.6, white blood cell count of 4.9,

and platelet count of 120,000.  AST, ALT, and alkaline phosphatase were all

normal.



IMPRESSION:

1.  Status post fall with rhabdomyolysis.

2.  Increased AST secondary to rhabdomyolysis.

3.  Dilated cardiomyopathy with an ejection fraction of around 20%.

4.  Anemia.



RECOMMENDATIONS:  Continue subacute rehab for deconditioning given the

patient's poor cardiac function and advanced age.  There are no plans for

endoscopy or colonoscopy in this 89-year-old gentleman.





__________________________________________

Lee German MD





DD:  01/31/2018 12:50:53

DT:  01/31/2018 12:52:35

Job # [id number]

## 2018-02-01 LAB
ALBUMIN SERPL-MCNC: 3.1 G/DL (ref 3–4.8)
ALBUMIN/GLOB SERPL: 0.9 {RATIO} (ref 1.1–1.8)
ALT SERPL-CCNC: 35 U/L (ref 7–56)
APTT BLD: 29.7 SECONDS (ref 25.1–36.5)
AST SERPL-CCNC: 37 U/L (ref 17–59)
BASOPHILS # BLD AUTO: 0.01 K/MM3 (ref 0–2)
BASOPHILS NFR BLD: 0.2 % (ref 0–3)
BILIRUB DIRECT SERPL-MCNC: 0.3 MG/DL (ref 0–0.4)
BUN SERPL-MCNC: 21 MG/DL (ref 7–21)
CALCIUM SERPL-MCNC: 9.5 MG/DL (ref 8.4–10.5)
EOSINOPHIL # BLD: 0.1 10*3/UL (ref 0–0.7)
EOSINOPHIL NFR BLD: 1.7 % (ref 1.5–5)
ERYTHROCYTE [DISTWIDTH] IN BLOOD BY AUTOMATED COUNT: 13.5 % (ref 11.5–14.5)
GFR NON-AFRICAN AMERICAN: 57
GRANULOCYTES # BLD: 3.38 10*3/UL (ref 1.4–6.5)
GRANULOCYTES NFR BLD: 65 % (ref 50–68)
HGB BLD-MCNC: 10.5 G/DL (ref 14–18)
INR PPP: 1.28 (ref 0.93–1.08)
LYMPHOCYTES # BLD: 0.9 10*3/UL (ref 1.2–3.4)
LYMPHOCYTES NFR BLD AUTO: 17.7 % (ref 22–35)
MAGNESIUM SERPL-MCNC: 1.9 MG/DL (ref 1.7–2.2)
MCH RBC QN AUTO: 30.1 PG (ref 25–35)
MCHC RBC AUTO-ENTMCNC: 32.9 G/DL (ref 31–37)
MCV RBC AUTO: 91.4 FL (ref 80–105)
MONOCYTES # BLD AUTO: 0.8 10*3/UL (ref 0.1–0.6)
MONOCYTES NFR BLD: 15.4 % (ref 1–6)
PLATELET # BLD: 125 10^3/UL (ref 120–450)
PMV BLD AUTO: 11.3 FL (ref 7–11)
PROTHROMBIN TIME: 14.8 SECONDS (ref 9.4–12.5)
RBC # BLD AUTO: 3.49 10^6/UL (ref 3.5–6.1)
WBC # BLD AUTO: 5.2 10^3/UL (ref 4.5–11)

## 2018-02-01 PROCEDURE — 0T9B70Z DRAINAGE OF BLADDER WITH DRAINAGE DEVICE, VIA NATURAL OR ARTIFICIAL OPENING: ICD-10-PCS | Performed by: UROLOGY

## 2018-02-01 PROCEDURE — F08Z1FZ DRESSING TECHNIQUES TREATMENT USING ASSISTIVE, ADAPTIVE, SUPPORTIVE OR PROTECTIVE EQUIPMENT: ICD-10-PCS | Performed by: INTERNAL MEDICINE

## 2018-02-01 PROCEDURE — F07Z9FZ GAIT TRAINING/FUNCTIONAL AMBULATION TREATMENT USING ASSISTIVE, ADAPTIVE, SUPPORTIVE OR PROTECTIVE EQUIPMENT: ICD-10-PCS | Performed by: INTERNAL MEDICINE

## 2018-02-01 PROCEDURE — F07L6ZZ THERAPEUTIC EXERCISE TREATMENT OF MUSCULOSKELETAL SYSTEM - LOWER BACK / LOWER EXTREMITY: ICD-10-PCS | Performed by: INTERNAL MEDICINE

## 2018-02-01 RX ADMIN — PANTOPRAZOLE SODIUM SCH MG: 20 TABLET, DELAYED RELEASE ORAL at 17:51

## 2018-02-01 RX ADMIN — DIGOXIN SCH MG: 0.12 TABLET ORAL at 14:17

## 2018-02-01 RX ADMIN — PANTOPRAZOLE SODIUM SCH MG: 20 TABLET, DELAYED RELEASE ORAL at 05:56

## 2018-02-01 NOTE — HP
HISTORY OF PRESENT ILLNESS:  The patient is an 89-year-old male, this

patient is now admitted to TCU after the patient was hospitalized on acute

medical service from 01/24/2018 to 01/30/2018.  The patient is now seen in

room number 322, bed 1.  The patient is comfortable.



Please refer to the history and physical examination and discharge summary

from the hospitalization of 01/24/2018 to 01/30/2018 for further details. 

The patient is seen today in room number 322, bed 1.  The patient is seen

lying in the bed.  The patient was made to set up in the bed.  The patient

is alert, awake and responsive.  Overnight nurses notes were reviewed.  The

patient still is requiring Calle catheter placement.



PHYSICAL EXAMINATION

VITAL SIGNS:  The patient's T-Max  is 97.5, pulse is 71, blood pressure is

102/56 and 98/58, respirations are 16 to 18, and O2 saturation is 97%.

HEENT:  The patient's head examination shows normocephalic and atraumatic. 

HEENT exam shows pinkish pale conjunctivae.  Anicteric sclerae.  No

oropharyngeal lesion.

NECK:  No neck rigidity.

CHEST:  Positive kyphosis.  Positive left upper chest AICD noted.  Positive

kyphosis.

LUNGS:  Examination shows no rales, crackles or wheezing.

CARDIOVASCULAR:  Examination shows S1 and S2, regular rate and rhythm. 

Positive systolic murmur at the left sternal border, right second

intercostal space and left second intercostal space.

ABDOMEN:  Soft.  Positive bowel sounds.  No hepatosplenomegaly noted.  No

guarding.  No rigidity.  No rebound tenderness noted.

GENITALIA:  Male.

RECTAL:  Examination is deferred.

EXTREMITIES:  Shows no pitting edema, no calf tenderness, and no Homans'

sign.

NEUROLOGIC:  The patient is alert, awake, and responsive.  He is able to

move upper and lower extremities without assistance.  Gait examination is

deferred.  Cranial nerves II through XII limited.

MUSCULOSKELETAL:  Shows a body mass index of 21.

VASCULAR:  Palpable pulses.



DIAGNOSTIC STUDIES:  WBC of 4.9, hemoglobin and hematocrit of 10.6 and 33,

and platelet of 120.  PT and PTT are 14.8 and 29.0.  Sodium of 136,

potassium of 4.6, chloride of 104, CO2 of 26, anion gap of 10, BUN of 18,

and creatinine of 1.1.  GFR is greater than 60.  Glucose of 98 and calcium

of 9.0.  Phosphorus of 3.3 and magnesium of 1.6.  LFTs are normal.  Albumin

is 2.8.



IMPRESSION:

1.  Deconditioning.

2.  Gait dysfunction.

3.  Acute renal failure.

4.  Severe symptomatic rhabdomyolysis.

5.  Status post mechanical fall.

6.  Transaminitis secondary to rhabdomyolysis.

7.  Dilated cardiomyopathy with ejection fraction of 20%.

8.  Hypotension.

9.  Urinary retention, requiring Calle catheter placement.

10.  Normocytic anemia.

11.  Coumadin dependent atrial fibrillation.

12.  Hypomagnesemia.

13.  Hyperbilirubinemia.

14.  Hypertension.

15.  Status post syncope.

16.  Transaminitis

16.  Atrial fibrillation.

17.  Prostatic hypertrophy.



PLAN:  At this time, the patient is to be admitted to Transitional Care

Unit.  The patient has been ordered Physical Therapy, Occupational Therapy,

ambulation therapy, and gait training.  Repeat CMP, LFTs, magnesium,

phosphorus, CBC, PT, and PTT has ordered.



CURRENT CONSULTATIONS:

1.  Cardiology.

2.  Gastroenterology.

3.  Urology for evaluation of urinary retention.



CURRENT MEDICATIONS:

1.  Aspirin 81 mg p.o. daily.

2.  Coumadin 3 mg daily.

3  Flomax 0.4 mg daily.

4.  Digoxin 0.125 mg p.o. daily.

5.  The patient was started on ProAmatine 2.5 mg three times a day.

6.  Protonix 40 mg daily.

7.  Zestril 2.5 mg daily.



DIET:  The patient is on heart-healthy diet.



ACTIVITIES:  The patient has been ordered out of bed to chair.  The patient

has been ordered KARI stockings.



DATE OF SERVICE/DICTATION AND ADMISSION:  01/31/2018



Dictated and electronically signed, not read.



Signing off Phong Cordon MD







__________________________________________

Phong Cordon MD





DD:  01/31/2018 22:17:56

DT:  01/31/2018 23:18:14

Job # [id number]

## 2018-02-01 NOTE — PN
DATE:  02/01/2018



SUBJECTIVE:  The patient is seen in room 322, bed 1.  The patient is seen

out of bed to chair.  The patient is lying in the bed.  The patient still

requires Calle.  Overnight nurse's notes were reviewed.  The patient was

found to be alert, awake, responsive.



PHYSICAL EXAMINATION:

VITAL SIGNS:  T-max 98.7-98.1, pulse 62-65, blood pressure _____ 100/57,

respirations 16-18, O2 sat 98%.  Intake output, output is 1500.

HEAD:  Normocephalic, atraumatic.

EENT:  Shows pinkish pale conjunctivae.  Anicteric sclerae.  No

oropharyngeal lesion.  No neck rigidity.

CHEST:  Kyphosis.  Positive left upper chest AICD noted.

LUNGS:  Show no rales, crackles, or wheezing.

CARDIOVASCULAR:  S1 and S2.  Regular rhythm.  Positive systolic murmur left

sternal border, right second intercostal space, left second intercostal

space.

ABDOMEN:  Soft.  Positive bowel sounds.  No hepatosplenomegaly.  No

guarding.  No rigidity.  No rebound tenderness.

GENITALIA:  Male.  Positive Calle catheter.

EXTREMITIES:  Show no pitting edema, no calf numbness, no Homans sign.

MUSCULOSKELETAL:  Shows a body mass index of 21.

NEUROLOGIC:  The patient is alert, awake, responsive, follows command. 

Moves upper and lower extremities without assistance.  Gait examination is

not tested.

PSYCHIATRIC:  Negative.



DIAGNOSTICS DATA:  On 02/01/2018, WBC 5.2, hemoglobin and hematocrit 10.5

and 32, platelets 125.  PT and PTT 14.8 and 29.7.  Sodium 135, potassium

5.0, chloride 103, CO2 of 28, anion gap 10, BUN 21, creatinine 1.2, GFR

greater than 60, glucose 97, calcium 9.5, phosphorus 3.5, magnesium is 1.9.

LFTs are normal.



IMPRESSION:

1.  Deconditioning.

2.  Gait dysfunction.

3.  Hypotension.

4.  Normocytic anemia.

5.  Coumadin-dependent atrial fibrillation.

6.  Status post acute renal failure.

7.  Rhabdomyolysis.

8.  Hypomagnesemia.

9.  Prostatic hypertrophy.

10.  Borderline hyperkalemia.

11.  End-stage dilated cardiomyopathy.

12.  Nonobstructive coronary artery disease.



PLAN:  At this time;

1.  The patient will be given a voiding trial.

2.  The patient is on serial labs.



CURRENT CONSULTATION:  Cardiology, Gastroenterology, and Urology.



CURRENT MEDICATIONS:

1.  Aspirin 81 mg daily.

2.  Coumadin 3 mg daily.

3.  Flomax 0.4 mg daily.

4.  Digoxin 0.125 mg daily.

5.  Midodrine (ProAmatine) 2.5 mg 3 times a day.

6.  Protonix 20 mg twice a day.

7.  Zestril 2.5 mg daily.



The patient has been ordered KARI stockings, SCDs.  The patient has been

ordered out of bed to chair.



The patient will be continued on TCU stay with physical therapy, ambulation

therapy, and gait training.



We will review the patient's clinical status tomorrow regarding the voiding

trial.



Dictated and electronically signed, not read.





__________________________________________

Phong Cordon MD



DD:  02/01/2018 20:22:07

DT:  02/01/2018 20:24:48

Job # [id number]

## 2018-02-01 NOTE — PN
DATE:



The patient is in the TCU.  Without shortness of breath, without chest

pain.



PHYSICAL EXAMINATION

VITAL SIGNS:  Blood pressure 95/52, heart rate in the 60s.

NECK:  Negative JVD.

LUNGS:  Without rales.

CARDIOVASCULAR:  S1, S2.

EXTREMITIES:  Without edema.



DATA:  Hemoglobin is 10.5.  Chemistries, BUN and creatinine are

unremarkable.



IMPRESSION

1.  Status post intravenous dobutamine for congestive heart failure.

2.  End-stage dilated cardiomyopathy.

3.  Nonobstructive coronary artery disease.

4.  Dyspnea, which is improved.



Given these findings, we will continue physical therapy in the TCU with his

medication, which includes aspirin, low-dose digoxin, and lisinopril. 

Continue Coumadin for atrial fibrillation.











__________________________________________

Humberto Berry MD



DD:  02/01/2018 11:38:51

DT:  02/01/2018 11:42:19

Job # [id number]

## 2018-02-02 LAB
ALBUMIN SERPL-MCNC: 3.1 G/DL (ref 3–4.8)
ALBUMIN/GLOB SERPL: 0.9 {RATIO} (ref 1.1–1.8)
ALT SERPL-CCNC: 28 U/L (ref 7–56)
APTT BLD: 27 SECONDS (ref 25.1–36.5)
AST SERPL-CCNC: 39 U/L (ref 17–59)
BASOPHILS # BLD AUTO: 0.01 K/MM3 (ref 0–2)
BASOPHILS NFR BLD: 0.2 % (ref 0–3)
BILIRUB DIRECT SERPL-MCNC: 0.4 MG/DL (ref 0–0.4)
BUN SERPL-MCNC: 27 MG/DL (ref 7–21)
CALCIUM SERPL-MCNC: 9.6 MG/DL (ref 8.4–10.5)
EOSINOPHIL # BLD: 0.1 10*3/UL (ref 0–0.7)
EOSINOPHIL NFR BLD: 2 % (ref 1.5–5)
ERYTHROCYTE [DISTWIDTH] IN BLOOD BY AUTOMATED COUNT: 13.5 % (ref 11.5–14.5)
GFR NON-AFRICAN AMERICAN: 57
GRANULOCYTES # BLD: 3.31 10*3/UL (ref 1.4–6.5)
GRANULOCYTES NFR BLD: 59.5 % (ref 50–68)
HGB BLD-MCNC: 10.4 G/DL (ref 14–18)
INR PPP: 1.26 (ref 0.93–1.08)
LYMPHOCYTES # BLD: 1.3 10*3/UL (ref 1.2–3.4)
LYMPHOCYTES NFR BLD AUTO: 23.4 % (ref 22–35)
MAGNESIUM SERPL-MCNC: 1.7 MG/DL (ref 1.7–2.2)
MCH RBC QN AUTO: 30 PG (ref 25–35)
MCHC RBC AUTO-ENTMCNC: 32.7 G/DL (ref 31–37)
MCV RBC AUTO: 91.6 FL (ref 80–105)
MONOCYTES # BLD AUTO: 0.8 10*3/UL (ref 0.1–0.6)
MONOCYTES NFR BLD: 14.9 % (ref 1–6)
PLATELET # BLD: 141 10^3/UL (ref 120–450)
PMV BLD AUTO: 11.7 FL (ref 7–11)
PROTHROMBIN TIME: 14.5 SECONDS (ref 9.4–12.5)
RBC # BLD AUTO: 3.47 10^6/UL (ref 3.5–6.1)
WBC # BLD AUTO: 5.6 10^3/UL (ref 4.5–11)

## 2018-02-02 RX ADMIN — POLYETHYLENE GLYCOL 3350 SCH GM: 17 POWDER, FOR SOLUTION ORAL at 11:06

## 2018-02-02 RX ADMIN — DIGOXIN SCH MG: 0.12 TABLET ORAL at 13:35

## 2018-02-02 RX ADMIN — PANTOPRAZOLE SODIUM SCH MG: 20 TABLET, DELAYED RELEASE ORAL at 05:58

## 2018-02-02 RX ADMIN — PANTOPRAZOLE SODIUM SCH MG: 20 TABLET, DELAYED RELEASE ORAL at 17:00

## 2018-02-02 NOTE — CON
DATE:  02/01/2018



CHIEF COMPLAINT:  Status post fall.



HISTORY OF PRESENT ILLNESS:  This is an 89-year-old male, who is seen on

the Transitional Care Unit at [facility].  The patient was

recovering after a fall and is now receiving rehabilitation.  During his

hospitalization, the patient was noted to have urinary retention.  At some

point, a Calle catheter was placed and it remains in place currently.  The

patient is a poor historian.  He is unable to answer whether the catheter

was in place prior to this admission o if this is a new finding.  He denies

any prior voiding issues.  He reports no prior prostate issues.  Denies

history of BPH or prostate cancer and he does report that he thinks he was

voiding well prior to coming to the hospital.



PAST MEDICAL HISTORY:  Includes CVA, hypertension, hypercholesterolemia,

atrial fibrillation.



HOME MEDICATIONS:  Included vitamin D, Zocor, Keflex, Zestril and Coumadin.



ALLERGIES:  NO KNOWN DRUG ALLERGIES.



Currently on Coumadin, Flomax, digoxin, ProAmatine and Zestril.



FAMILY HISTORY:  Noncontributory.



SOCIAL HISTORY:  Patient denies any smoking or EtOH use.



REVIEW OF SYSTEMS:  A 12-point review of systems was obtained from the

patient.  He reports some weakness and lethargy, some difficulty

ambulating, otherwise feels well and denies any other acute symptoms.



PHYSICAL EXAMINATION:

GENERAL:  The patient is seen in his room.  He is awake and alert.  He is

lying in bed, just finished eating his breakfast.

VITAL SIGNS:  He is afebrile.  Temperature of 98.1, pulse of 62, /57,

respirations 18.

NECK:  Supple.  There is no adenopathy.

CHEST:  Revealed normal inspiratory effort.

CARDIAC:  Showed positive S1, S2.  There was no peripheral edema noted.

ABDOMEN:  Soft, nontender, nondistended.  There is no hepatosplenomegaly or

costovertebral angle tenderness.

GENITOURINARY:  Phallus is normal.  There is a Calle catheter in place

draining clear colored urine.  Scrotum is normal.  Testes are bilaterally

descended, nontender, no masses.  Epididymides are normal.

EXTREMITIES:  There is no cyanosis or edema noted.



LABORATORY DATA:  WBC count normal, creatinine 1.2 with a GFR greater than

60.  Urine culture from 01/25/2018 showed no growth.  Urinalysis showed

large blood on 01/24/2018, 25-30 RBCs, 0-2 WBCs.  On radiologic exam, the

patient had a CT scan of the chest, abdomen and pelvis on 01/24/2018, which

showed there is bilateral renal pelvocaliectasis with mild bilateral

hydroureter, no obstructing stones were visualized.  There is moderate

fecal material in the rectum, wall thickening of the stomach, Calle

catheter was seen in the bladder and nonspecific bladder wall thickening. 

The patient had a PSA of 2.1 on 01/25/2018.



IMPRESSION AND PLAN:  This is an 89-year-old male with history of

cerebrovascular accident, who had falls at home.  Urologically, patient is

currently stable with an indwelling Calle catheter.  The patient has been

started on Flomax and I would plan on a voiding trial.  Postvoid residual

will need to be checked.  The patient had mild bilateral fullness of both

renal systems likely secondary to bladder outlet obstruction, which has now

been relieved with the Calle catheter.  If the patient is unable to void

with the Calle catheter removed, a new Calle catheter should be placed and

we will need to discuss further plans.  Plan will be to consider a

cystoscopy and possible laser vaporization; however, the patient is 89

years old with multiple medical issues, likely a better plan would be to

maintain him with an indwelling Calle catheter which should be changed

monthly.



Thank you for Allowing me to participate in the care of this patient.  We

will follow him with you.





__________________________________________

Micheal West MD



DD:  02/01/2018 18:29:15

DT:  02/01/2018 18:35:47

Job # [id number]

## 2018-02-02 NOTE — CP.PCM.PN
Subjective





- Date & Time of Evaluation


Date of Evaluation: 02/02/18


Time of Evaluation: 13:44





- Subjective


Subjective: 


Medicine Progress Note:





Patient seen and assessed at bedside in TCU. No acute events noted overnight by 

nursing staff or patient. Patient is alert and oriented to person and place but 

not to time and event. He offers no complaints at this time, including fever, 

chills, headache, chest pain, SOB, abdominal pain, or any pain with urination.





Objective





- Vital Signs/Intake and Output


Vital Signs (last 24 hours): 


 











Temp Pulse Resp BP Pulse Ox


 


 98.2 F   60   18   98/51 L  96 


 


 02/02/18 05:28  02/02/18 05:28  02/02/18 05:28  02/02/18 11:06  02/02/18 05:28








Intake and Output: 


 











 02/02/18 02/02/18





 06:59 18:59


 


Intake Total 240 


 


Output Total 3150 


 


Balance -2910 














- Medications


Medications: 


 Current Medications





Aspirin (Aspirin Chewable)  81 mg PO 0800 [facility]


   PRN Reason: Protocol


   Last Admin: 02/02/18 08:14 Dose:  81 mg


Digoxin (Lanoxin)  0.125 mg PO 1400 [facility]


   PRN Reason: Protocol


   Last Admin: 02/02/18 13:35 Dose:  0.125 mg


Lisinopril (Zestril)  2.5 mg PO DAILY [facility]


   PRN Reason: Protocol


   Last Admin: 02/02/18 11:06 Dose:  Not Given


Midodrine (Proamatine)  5 mg PO TID [facility]


   Last Admin: 02/02/18 13:35 Dose:  5 mg


Pantoprazole Sodium (Protonix Ec Tab)  20 mg PO 0600,1600 [facility]


   Last Admin: 02/02/18 05:58 Dose:  20 mg


Pneumococcal Polyvalent Vaccine (Pneumovax 23 Vaccine)  0.5 ml IM .ONCE ONE


   Stop: 02/06/18 10:01


Polyethylene Glycol (Miralax)  17 gm PO DAILY [facility]


   Last Admin: 02/02/18 11:06 Dose:  17 gm


Tamsulosin HCl (Flomax)  0.4 mg PO 1830 [facility]


   PRN Reason: Protocol


   Last Admin: 02/01/18 17:53 Dose:  0.4 mg


Warfarin Sodium (Coumadin)  3 mg PO 1800 [facility]


   PRN Reason: Protocol


   Last Admin: 02/01/18 17:49 Dose:  3 mg











- Labs


Labs: 


 





 02/02/18 06:45 





 02/02/18 06:45 





 











PT  14.5 SECONDS (9.4-12.5)  H  02/02/18  06:45    


 


INR  1.26  (0.93-1.08)  H  02/02/18  06:45    


 


APTT  27.0 Seconds (25.1-36.5)   02/02/18  06:45    














- Constitutional


Appears: Non-toxic, No Acute Distress





- Head Exam


Head Exam: ATRAUMATIC, NORMAL INSPECTION, NORMOCEPHALIC





- ENT Exam


ENT Exam: Mucous Membranes Moist, Normal Exam





- Neck Exam


Neck Exam: Full ROM, Normal Inspection.  absent: Lymphadenopathy





- Respiratory Exam


Respiratory Exam: Clear to Ausculation Bilateral, NORMAL BREATHING PATTERN





- Cardiovascular Exam


Cardiovascular Exam: REGULAR RHYTHM, +S1, +S2.  absent: Murmur





- GI/Abdominal Exam


GI & Abdominal Exam: Soft, Normal Bowel Sounds.  absent: Tenderness





- Extremities Exam


Extremities Exam: Full ROM, Normal Capillary Refill, Normal Inspection.  absent

: Calf Tenderness, Joint Swelling, Pedal Edema, Tenderness





- Back Exam


Back Exam: Full ROM, NORMAL INSPECTION.  absent: CVA tenderness (L), CVA 

tenderness (R)





- Neurological Exam


Neurological Exam: Alert, Awake, Oriented x3





- Psychiatric Exam


Psychiatric exam: Normal Affect, Normal Mood





- Skin


Skin Exam: Dry, Intact, Normal Color, Warm





Assessment and Plan





- Assessment and Plan (Free Text)


Assessment: 


89 year old male with a past medical history significant for previous CVA with 

residual LLE weakness, CAD, cardiomyopathy w/ AICD, Atrial Fibrillation (on 

coumadin), and PVD who came in status post fall of unknown etiology. His renal 

function was poor upon arrival, likely secondary to rhabdomyolysis, but has 

since improved. He was taken to cardiac cath after he was found to have 

elevated troponin but was found to have no critical stenosis. He was then 

transferred to the TCU for continued rehabilitation.


Plan: 


1. Syncope s/p Fall


-Two Head CT's have shown no acute intracranial hemorrhages and left frontal/

parietal cystic encephalomalacia


-Carotid Doppler revealed 20-39% stenosis of bilateral proximal ICA's


-Orthostatic Vital Signs and Landon-Hallpike negative


-Continue PT/OT, all recommendations appreciated


 


2. End Stage Dilated Cardiomyopathy


-Echo showed severe systolic dysfunction and an LVEF of 17.9%


-Started Midodrine 5mg PO TID


-Continue low dose Digoxin


-Heart Healthy Diet


-Cardiology consulted, all recommendations appreciated





3. History of CAD


-Continue ASA





4. History of Atrial Fibrillation


-Continue Coumadin


-Daily PT/INR/PTT





5. History of BPH


-Will need indwelling wallis catheter as voiding trial failed with 800ml of post 

void residual 


-Continue home Flomax


-Urology consulted, all recommendations appreciated





6. History of HTN


-Continue home Lisinopril





7. History of Constipation


-Continue Miralax





GI Prophylaxis: Protonix


DVT Prophylaxis: SCD's





Patient seen and case discussed with attending, Dr. Cordon.

## 2018-02-02 NOTE — PN
DATE:  02/02/2018



SUBJECTIVE:  The patient is seen in physical therapy room.  The patient is

doing stationary bike.  Overnight nurse's notes were reviewed.  The patient

was given a voiding trial, but patient could not tolerate it and patient

had extreme difficulty urinating.  The patient's abdomen was distended with

discomfort.  Bladder scan shows more than 850 mL of urine.  The patient's

Calle catheter has to be re-inserted.



PHYSICAL EXAMINATION:

VITAL SIGNS:  T-max 98.2; pulse 60; blood pressure is 98/51, 110/78;

respirations 18; O2 sat is 96%.  Intake and output, output is 3150.

HEAD:  Normocephalic, atraumatic.

EENT:  Shows pinkish pale conjunctivae.  Anicteric sclerae.  No

oropharyngeal lesion.  No neck rigidity.

CHEST:  Kyphosis.  Positive left upper chest AICD.

LUNGS:  Shows no rales, crackles, or wheezing.

CARDIOVASCULAR:  S1, S2, regular rhythm.  Positive systolic murmur, left

sternal border, left second intercostal space.

ABDOMEN:  Soft.  Positive bowel sounds.  Slight suprapubic dullness.  No

hepatosplenomegaly noted.  No guarding.  No rigidity.  No rebound

tenderness.  No costovertebral angle tenderness.

EXTREMITIES:  No pitting edema, no calf numbness, no Homans signs.

MUSCULOSKELETAL:  Shows body mass index of 21.2.

NEUROLOGIC:  Cranial nerves II through XII intact and limited.  Gait

examination, not tested.  The patient is ambulating with physical therapy.



DIAGNOSTIC DATA:  On 02/02/2018, WBC 5.6, hemoglobin and hematocrit 10.4

and 31.8, platelets 141.  PT 14.5, INR 1.26, PTT 27.  Sodium 136, potassium

4.5, chloride 103, CO2 of 28, anion gap 10, BUN 27, creatinine 1.2, GFR

greater than 60, glucose 102, calcium 9.6, phosphorus 3.5, magnesium 1.7. 

LFTs are normal.



The patient was seen by Urology, recommendations noted.  Recommend to

continue with the Calle catheter and if patient fails the voiding trial,

patient needs to be discharged on indwelling Calle with outpatient Urology

followup and intervention regarding possible cystoscopy.



IMPRESSION:

1.  Gait dysfunction.

2.  Deconditioning.

3.  Recurrent urinary retention, requiring indwelling Calle catheter.

4.  Prostatic hypertrophy.

5.  Questionable syncope, status post fall.

6.  Urinary retention.

7.  Possible bladder outlet obstruction.

8.  Normocytic anemia.

9.  Coumadin-dependent atrial fibrillation.

10.  Borderline hyperkalemia.

11.  Prerenal kidney injury.

12.  Hypomagnesemia.



PLAN:  At this time, the patient will be ordered to repeat labs.



CURRENT CONSULTATION:

1.  Cardiology.

2.  GI.

3.  Urology.



CURRENT MEDICATIONS:  Aspirin 81 daily, Coumadin 3 mg daily, Flomax 0.4 mg

daily, digoxin 0.125 daily, MiraLax 17 g daily, the patient's ProAmatine is

increased to 5 mg 3 times a day, Protonix 40 mg daily, Zestril 2.5 mg

daily.  The patient is on heart healthy diet, out of bed, KARI stockings,

SCDs.  At present, the patient will be continued on TCU stay with close

followup.



Dictated and electronically signed, not read.





__________________________________________

Phong Cordon MD



DD:  02/02/2018 12:12:18

DT:  02/02/2018 13:18:32

Job # [id number]

## 2018-02-02 NOTE — PN
DATE:  02/02/2018



CARDIOLOGY FOLLOWUP



SUBJECTIVE:  The patient remains in the TCU.  No shortness of breath.  No

chest pain.



PHYSICAL EXAMINATION:

VITAL SIGNS:  Blood pressure is 98/51, the heart rates in the 60s.

NECK:  Negative JVD.

LUNGS:  Without rales.

HEART:  S1, S2.

EXTREMITIES:  Without edema.



LABORATORY DATA:  Hemoglobin is 10.4.  Chemistries:  BUN and creatinine 27

and 1.2.



IMPRESSION:

1.  End-stage dilated cardiomyopathy.

2.  Resolution of congestive heart failure.

3.  Improved forward outflow after a trial of IV dobutamine.

4.  Nonobstructive coronary artery disease.

5.  Resolution of dyspnea.



Given these findings, we will continue Coumadin for his atrial fibrillation

as well as digoxin and lisinopril.





__________________________________________

Humberto Berry MD



DD:  02/02/2018 14:06:49

DT:  02/02/2018 14:10:43

Job # [id number]

## 2018-02-03 LAB
APTT BLD: 27 SECONDS (ref 25.1–36.5)
INR PPP: 1.27 (ref 0.93–1.08)
PROTHROMBIN TIME: 147 SECONDS (ref 9.4–12.5)

## 2018-02-03 RX ADMIN — POLYETHYLENE GLYCOL 3350 SCH GM: 17 POWDER, FOR SOLUTION ORAL at 11:00

## 2018-02-03 RX ADMIN — DIGOXIN SCH MG: 0.12 TABLET ORAL at 15:23

## 2018-02-03 RX ADMIN — PANTOPRAZOLE SODIUM SCH MG: 20 TABLET, DELAYED RELEASE ORAL at 18:02

## 2018-02-03 RX ADMIN — PANTOPRAZOLE SODIUM SCH MG: 20 TABLET, DELAYED RELEASE ORAL at 05:44

## 2018-02-04 LAB
APTT BLD: 28.7 SECONDS (ref 25.1–36.5)
INR PPP: 1.35 (ref 0.93–1.08)
PROTHROMBIN TIME: 15.6 SECONDS (ref 9.4–12.5)

## 2018-02-04 RX ADMIN — PANTOPRAZOLE SODIUM SCH MG: 20 TABLET, DELAYED RELEASE ORAL at 05:29

## 2018-02-04 RX ADMIN — PANTOPRAZOLE SODIUM SCH MG: 20 TABLET, DELAYED RELEASE ORAL at 17:22

## 2018-02-04 RX ADMIN — POLYETHYLENE GLYCOL 3350 SCH GM: 17 POWDER, FOR SOLUTION ORAL at 09:59

## 2018-02-04 RX ADMIN — DIGOXIN SCH MG: 0.12 TABLET ORAL at 14:59

## 2018-02-04 NOTE — PN
DATE:  02/03/2018



SUBJECTIVE:  The patient is seen lying in the bed in room 322, bed 1.  The

patient is sleeping.  The patient was awakened.  The patient was advised to

sit up in the bed.  The patient was found to be alert, awake, responsive.



Overnight nurse's notes were reviewed.  No adverse events documented.  The

patient slept well overnight.



OBJECTIVE:

VITAL SIGNS:  T-max 98.2; heart rate 67, 74, 84, 74, 67; blood pressure is

110/78, 98/51, 99/52, 105/53; respiration 80 to 20, O2 sat 97% to 96%. 

Intake/output, the patient had a Calle catheter placed, yesterday's output

is 3150.

HEENT:  The patient's head examination, normocephalic, atraumatic.  HEENT

examination shows pinkish pale conjunctivae.  Anicteric sclerae.  No

oropharyngeal lesion.  No neck rigidity.  Soft carotid bruit.

CHEST:  Kyphosis.  Positive left upper chest AICD.

LUNGS:  Shows no rales, crackles or wheezing.

CARDIOVASCULAR:  Shows S1 and S2, regular rhythm.  Positive systolic

murmur, left sternal border, right second intercostal space, left second

intercostal space.

ABDOMEN:  Soft.  Positive bowel sounds.  No hepatosplenomegaly noted.  No

guarding.  No rigidity or rebound tenderness.

GENITALIA:  Male.

RECTAL:  Examination deferred.  Positive Calle catheter noted.  Positive

urine drainage noted in the Calle catheter.  No costovertebral angle

tenderness noted.

MUSCULOSKELETAL:  Shows a body mass index of 21.

NEUROLOGIC:  The patient is alert, awake, oriented x3.  Cranial nerves II

through XII intact.  Gait examination not tested.

VASCULAR:  Palpable pulses.



LABORATORY DATA:  January 3rd, PT 14.0, INR 1.7.  Chemistry is none from

today.



IMPRESSION AND PLAN:

1.  Urinary retention requiring Calle catheter drainage.

2.  End-stage dilated cardiomyopathy.

3.  Improvement of the systolic congestive heart failure after IV

dobutamine trial.

4.  Nonobstructive coronary artery disease.

5.  Status post fall.

6.  Gait dysfunction.

7.  Decreased body mass index.

8.  Hypotension.

9.  Hypomagnesemia.

10.  Mild prerenal kidney injury.

11.  Normocytic anemia.

12.  Deconditioning.

13.  Coumadin requiring atrial fibrillation.

14.  Prostate hypertrophy.

15.  Constipation.



PLAN:  At this time, the patient has been ordered out of bed, physical

therapy, occupational therapy, ambulation therapy, gait training.



The patient is seen by physical therapist.  The patient's last physical

therapy evaluation recommendation is home with physical therapy upon

completion of TCU.



CURRENT MEDICATIONS:  Aspirin 81 mg daily, Coumadin 3 mg daily, Flomax 0.4

mg daily, digoxin 0.125 mg daily, MiraLax 17 g daily, midodrine 5 mg 3

times a day, Protonix 20 mg twice a day, Zestril 2.5 mg daily.



The patient will be continued on above therapeutic intervention.  The

patient will be continued on physical therapy, occupational therapy,

ambulation therapy, gait training.



Dictated and electronically signed, not read.





__________________________________________

Phong Cordon MD



DD:  02/03/2018 17:45:47

DT:  02/03/2018 17:48:29

Job # [id number]

## 2018-02-05 LAB
ALBUMIN SERPL-MCNC: 3.1 G/DL (ref 3–4.8)
ALBUMIN/GLOB SERPL: 0.8 {RATIO} (ref 1.1–1.8)
ALT SERPL-CCNC: 21 U/L (ref 7–56)
APPEARANCE UR: (no result)
APTT BLD: 28.7 SECONDS (ref 25.1–36.5)
AST SERPL-CCNC: 31 U/L (ref 17–59)
BACTERIA #/AREA URNS HPF: (no result) /[HPF]
BASOPHILS # BLD AUTO: 0.02 K/MM3 (ref 0–2)
BASOPHILS NFR BLD: 0.3 % (ref 0–3)
BILIRUB DIRECT SERPL-MCNC: 0.4 MG/DL (ref 0–0.4)
BILIRUB UR-MCNC: NEGATIVE MG/DL
BUN SERPL-MCNC: 27 MG/DL (ref 7–21)
CALCIUM SERPL-MCNC: 9.2 MG/DL (ref 8.4–10.5)
COLOR UR: YELLOW
EOSINOPHIL # BLD: 0 10*3/UL (ref 0–0.7)
EOSINOPHIL NFR BLD: 0.5 % (ref 1.5–5)
ERYTHROCYTE [DISTWIDTH] IN BLOOD BY AUTOMATED COUNT: 13.3 % (ref 11.5–14.5)
GFR NON-AFRICAN AMERICAN: 57
GLUCOSE UR STRIP-MCNC: NEGATIVE MG/DL
GRANULOCYTES # BLD: 4.91 10*3/UL (ref 1.4–6.5)
GRANULOCYTES NFR BLD: 66.2 % (ref 50–68)
HGB BLD-MCNC: 10 G/DL (ref 14–18)
INR PPP: 1.45 (ref 0.93–1.08)
LEUKOCYTE ESTERASE UR-ACNC: (no result) LEU/UL
LYMPHOCYTES # BLD: 1.2 10*3/UL (ref 1.2–3.4)
LYMPHOCYTES NFR BLD AUTO: 16.6 % (ref 22–35)
MAGNESIUM SERPL-MCNC: 1.7 MG/DL (ref 1.7–2.2)
MCH RBC QN AUTO: 29.4 PG (ref 25–35)
MCHC RBC AUTO-ENTMCNC: 31.9 G/DL (ref 31–37)
MCV RBC AUTO: 92.1 FL (ref 80–105)
MONOCYTES # BLD AUTO: 1.2 10*3/UL (ref 0.1–0.6)
MONOCYTES NFR BLD: 16.4 % (ref 1–6)
PH UR STRIP: 7 [PH] (ref 4.7–8)
PLATELET # BLD: 147 10^3/UL (ref 120–450)
PMV BLD AUTO: 10.9 FL (ref 7–11)
PROT UR STRIP-MCNC: NEGATIVE MG/DL
PROTHROMBIN TIME: 16.8 SECONDS (ref 9.4–12.5)
RBC # BLD AUTO: 3.4 10^6/UL (ref 3.5–6.1)
RBC # UR STRIP: (no result) /UL
RBC #/AREA URNS HPF: (no result) /HPF (ref 0–2)
SP GR UR STRIP: 1.01 (ref 1–1.03)
URATE SERPL-MCNC: 5.1 MG/DL (ref 3.5–8.5)
URINE NITRATE: NEGATIVE
UROBILINOGEN UR STRIP-ACNC: >=8 E.U./DL
WBC # BLD AUTO: 7.4 10^3/UL (ref 4.5–11)
WBC #/AREA URNS HPF: (no result) /HPF (ref 0–6)

## 2018-02-05 RX ADMIN — CEFEPIME SCH MLS/HR: 1 INJECTION, SOLUTION INTRAVENOUS at 21:58

## 2018-02-05 RX ADMIN — PANTOPRAZOLE SODIUM SCH MG: 20 TABLET, DELAYED RELEASE ORAL at 05:59

## 2018-02-05 RX ADMIN — POLYETHYLENE GLYCOL 3350 SCH GM: 17 POWDER, FOR SOLUTION ORAL at 12:53

## 2018-02-05 RX ADMIN — PANTOPRAZOLE SODIUM SCH MG: 20 TABLET, DELAYED RELEASE ORAL at 17:25

## 2018-02-05 RX ADMIN — DIGOXIN SCH MG: 0.12 TABLET ORAL at 14:08

## 2018-02-05 NOTE — RAD
HISTORY:

temp 101  



COMPARISON:

01/24/2018



TECHNIQUE:

Chest PA and lateral



FINDINGS:



LUNGS:

No active pulmonary disease.



PLEURA:

No significant pleural effusion identified. No pneumothorax apparent.



CARDIOVASCULAR:

Normal.



OSSEOUS STRUCTURES:

No significant abnormalities.



VISUALIZED UPPER ABDOMEN:

Normal.



OTHER FINDINGS:

Duo lead pacemaker



IMPRESSION:

No active disease.

## 2018-02-05 NOTE — PN
DATE:  02/05/2018



SUBJECTIVE:  The patient is without symptoms, working out with physical

therapy without issues.



OBJECTIVE

VITAL SIGNS:  Blood pressure is 123/68, heart rate in the 60s.

NECK:  Negative JVD.

LUNGS:  Without rales.

HEART:  S1, S2.

EXTREMITIES:  Without edema.



LABORATORY DATA:  Hemoglobin is 10.  Chemistries:  BUN and creatinine are

27 and 1.2.



IMPRESSION

1.  Stable angina.

2.  Coronary artery disease.

3.  History of history of dilated cardiomyopathy.

4.  Resolution of congestive heart failure.

5.  No dyspnea noted.



Given these findings, the patient is doing well on his current medications,

which include an ACE inhibitor.

Continue physical therapy.











__________________________________________

Humberto Berry MD



DD:  02/05/2018 11:44:18

DT:  02/05/2018 11:48:07

Job # [id number]

## 2018-02-05 NOTE — PN
DATE:  02/04/2018



SUBJECTIVE:  The patient is seen again, lying in room 322, bed 1.  The

patient is alert, awake, responsive.  The patient does not appear to be in

any distress.  Overnight nurse's notes were reviewed.  The patient slept

well without any adverse event.



OBJECTIVE:

GENERAL:  The patient was found to be alert, awake, oriented x3.

VITAL SIGNS:  T-max 98.4-98.7, heart rate 64-68-60, blood pressure 123/68,

96/55, 102/43, 105/53, respiratory rate 18-20, O2 sat 96%.  Intake and

output are not documented correctly.

HEENT:  Head; normocephalic, atraumatic.  Eyes, pinkish pale conjunctivae. 

Anicteric sclerae.  No oropharyngeal lesion.  No jugular venous distention.

Soft carotid bruit.

CHEST:  Shows positive left upper chest AICD.  Positive kyphosis.

LUNGS:  Shows no rales, crackles or wheezing.

CARDIOVASCULAR:  S1, S2, regular rhythm.  Positive systolic murmur in the

left sternal border, left second intercostal space.

ABDOMEN:  Soft.  No hepatosplenomegaly noted.  No guarding.  No rigidity. 

No rebound tenderness.  No masses palpable.  No costovertebral angle

tenderness.

GENITALIA:  Male.  Positive Calle catheter, draining urine more than 500 mL

present in the bag.

MUSCULOSKELETAL:  Shows a body mass index of 21.

NEUROLOGICAL:  Cranial nerves II-XII limited.  Gait is not tested.

VASCULAR:  Palpable pulses.



DIAGNOSTICS:  On February 4th; PT is 15.6, INR 1.35.



IMPRESSION:

1.  Deconditioning.

2.  Gait dysfunction.

3.  Transient hypotension.

4.  End-stage dilated ischemic cardiomyopathy.

5.  Coumadin dependent atrial fibrillation.

6.  Normocytic anemia.

7.  Status post syncope.

8.  Status post fall.

9.  Status post rhabdomyolysis.

10.  Hypomagnesemia.

11.  Status post automatic implantable cardioverter-defibrillator implant.

12.  History of cerebral infarct.

13.  Constipation.

14.  Prostatic hypertrophy.

15.  Hypertension.



PLAN:  At this time, the patient will be monitored very closely on the

PT/INR.  The patient will continue on the transitional care unit till

approved days.  Consultation with Cardiology and Gastroenterology.



CURRENT MEDICATIONS:  Aspirin 81 mg daily, Coumadin 3 mg daily, Flomax 0.4

mg daily, digoxin 0.125 mg daily, MiraLax 17 g daily, Midodrine 5 mg three

times a day which may need to be titrated up if the patient's blood

pressure does not reach the goal yvonne, Protonix 40 mg daily, Zestril 2.5 mg

daily.



Heart-healthy diet, out of bed, KARI stockings, SCDs, physical therapy,

occupational therapy ordered.  The patient has been seen by Physical

Therapy today.



RECOMMENDATIONS:  Discharge recommendation, home with services.



The patient has been admitted to Transitional Care Unit from 01/30/2018.



The patient will be ordered repeat complete labs for the morning.  The

patient's case will be referred to  for discharge planning

and discharge needs at home.



Dictated and electronically signed, not read.







__________________________________________

Phong Cordon MD





DD:  02/04/2018 16:51:18

DT:  02/04/2018 16:56:14

Job # [id number]

## 2018-02-05 NOTE — CP.PCM.CON
History of Present Illness





- History of Present Illness


History of Present Illness: 





89 year old male with PMH of CVA, HTN, dyslipidemia, atrial fibrillation, S/P 

pacemaker placement [place] [facility] [place] and work 

up has been done for it. He is now transferred to [facility] for continued medical 

therapy and physical rehab. He developed fever this morning and Infectious 

diseases consult is requested to further evaluate and manage. He has no 

rhinorrhea, no sore throat, no SOB, no cough currently, no diarrhea, no 

abdominal pain, no dysuria, no nausea or vomiting.





Review of Systems





- Review of Systems


All systems: reviewed and no additional remarkable complaints except (as per HPI

)





Past Patient History





- Infectious Disease


Hx of Infectious Diseases: None





- Tetanus Immunizations


Tetanus Immunization: Unknown





- Past Social History


Smoking Status: Never Smoked





- CARDIAC


Hx Cardiac Disorders: Yes


Hx Congestive Heart Failure: Yes


Hx Hypercholesterolemia: Yes


Hx Hypertension: Yes





- NEUROLOGICAL


HX Cerebrovascular Accident: Yes





- HEMATOLOGICAL/ONCOLOGICAL


Hx Blood Transfusions: No


Hx Blood Transfusion Reaction: No





- MUSCULOSKELETAL/RHEUMATOLOGICAL


Hx Falls: Yes





- GASTROINTESTINAL


Hx Gastrointestinal Disorders: No





- GENITOURINARY/GYNECOLOGICAL


Hx Genitourinary Disorders: Yes (urine retention)


Hx Reproductive Disorders: Yes (BPH)





- PSYCHIATRIC


Hx Substance Use: No





- SURGICAL HISTORY


Hx Surgeries: Yes





- ANESTHESIA


Hx Anesthesia Reactions: No


Hx Malignant Hyperthermia: No





Meds


Allergies/Adverse Reactions: 


 Allergies











Allergy/AdvReac Type Severity Reaction Status Date / Time


 


No Known Allergies Allergy   Verified 01/31/18 20:39














- Medications


Medications: 


 Current Medications





Acetaminophen (Tylenol 325mg Tab)  650 mg PO Q6H PRN


   PRN Reason: FOR FEVER >=99.5F


Aspirin (Aspirin Chewable)  81 mg PO 0800 [facility]


   PRN Reason: Protocol


   Last Admin: 02/05/18 08:21 Dose:  81 mg


Digoxin (Lanoxin)  0.125 mg PO 1400 [facility]


   PRN Reason: Protocol


   Last Admin: 02/04/18 14:59 Dose:  0.125 mg


Cefepime HCl (Maxipime 1gm)  1 gm in 100 mls @ 100 mls/hr IVPB Q12 [facility]


   PRN Reason: Protocol


Lisinopril (Zestril)  2.5 mg PO DAILY [facility]


   PRN Reason: Protocol


   Last Admin: 02/04/18 10:00 Dose:  Not Given


Midodrine (Proamatine)  5 mg PO TID [facility]


   Last Admin: 02/04/18 17:23 Dose:  5 mg


Pantoprazole Sodium (Protonix Ec Tab)  20 mg PO 0600,1600 [facility]


   Last Admin: 02/05/18 05:59 Dose:  20 mg


Pneumococcal Polyvalent Vaccine (Pneumovax 23 Vaccine)  0.5 ml IM .ONCE ONE


   Stop: 02/06/18 10:01


Polyethylene Glycol (Miralax)  17 gm PO DAILY [facility]


   Last Admin: 02/04/18 09:59 Dose:  17 gm


Tamsulosin HCl (Flomax)  0.4 mg PO 1830 [facility]


   PRN Reason: Protocol


   Last Admin: 02/04/18 17:47 Dose:  0.4 mg


Warfarin Sodium (Coumadin)  4 mg PO 1800 [facility]


   PRN Reason: Protocol











Physical Exam





- Constitutional


Appears: Non-toxic, Chronically Ill





- Head Exam


Head Exam: NORMAL INSPECTION





- Respiratory Exam


Respiratory Exam: Decreased Breath Sounds





- Cardiovascular Exam


Cardiovascular Exam: +S1, +S2





- GI/Abdominal Exam


GI & Abdominal Exam: Soft.  absent: Tenderness





Results





- Vital Signs


Recent Vital Signs: 


 Last Vital Signs











Temp  101.1 F H  02/05/18 05:58


 


Pulse  64   02/04/18 16:23


 


Resp  18   02/04/18 16:23


 


BP  123/68   02/04/18 16:23


 


Pulse Ox  96   02/04/18 16:23














- Labs


Result Diagrams: 


 02/05/18 06:30





 02/05/18 06:30


Labs: 


 Laboratory Results - last 24 hr











  02/04/18 02/05/18 02/05/18





  08:30 06:30 06:30


 


WBC    7.4  D


 


RBC    3.40 L


 


Hgb    10.0 L


 


Hct    31.3 L


 


MCV    92.1


 


MCH    29.4


 


MCHC    31.9


 


RDW    13.3


 


Plt Count    147


 


MPV    10.9


 


Gran %    66.2


 


Lymph % (Auto)    16.6 L


 


Mono % (Auto)    16.4 H


 


Eos % (Auto)    0.5 L


 


Baso % (Auto)    0.3


 


Gran #    4.91


 


Lymph # (Auto)    1.2


 


Mono # (Auto)    1.2 H


 


Eos # (Auto)    0.0


 


Baso # (Auto)    0.02


 


PT   16.8 H 


 


INR   1.45 H 


 


APTT   28.7 


 


Sodium   


 


Potassium   


 


Chloride   


 


Carbon Dioxide   


 


Anion Gap   


 


BUN   


 


Creatinine   


 


Est GFR ( Amer)   


 


Est GFR (Non-Af Amer)   


 


Random Glucose   


 


Lactic Acid   


 


Uric Acid   


 


Calcium   


 


Phosphorus   


 


Magnesium   


 


Total Bilirubin   


 


Direct Bilirubin   


 


AST   


 


ALT   


 


Alkaline Phosphatase   


 


Total Protein   


 


Albumin   


 


Globulin   


 


Albumin/Globulin Ratio   


 


Urine Color   


 


Urine Appearance   


 


Urine pH   


 


Ur Specific Gravity   


 


Urine Protein   


 


Urine Glucose (UA)   


 


Urine Ketones   


 


Urine Blood   


 


Urine Nitrate   


 


Urine Bilirubin   


 


Urine Urobilinogen   


 


Ur Leukocyte Esterase   


 


Urine RBC   


 


Urine WBC   


 


Urine Bacteria   


 


Digoxin  0.7 L  














  02/05/18 02/05/18 02/05/18





  06:30 07:11 07:18


 


WBC   


 


RBC   


 


Hgb   


 


Hct   


 


MCV   


 


MCH   


 


MCHC   


 


RDW   


 


Plt Count   


 


MPV   


 


Gran %   


 


Lymph % (Auto)   


 


Mono % (Auto)   


 


Eos % (Auto)   


 


Baso % (Auto)   


 


Gran #   


 


Lymph # (Auto)   


 


Mono # (Auto)   


 


Eos # (Auto)   


 


Baso # (Auto)   


 


PT   


 


INR   


 


APTT   


 


Sodium  138  


 


Potassium  4.2  


 


Chloride  104  


 


Carbon Dioxide  25  


 


Anion Gap  13  


 


BUN  27 H  


 


Creatinine  1.2  


 


Est GFR ( Amer)  > 60  


 


Est GFR (Non-Af Amer)  57  


 


Random Glucose  146 H  


 


Lactic Acid   


 


Uric Acid  5.1  


 


Calcium  9.2  


 


Phosphorus  2.9  


 


Magnesium  1.7  


 


Total Bilirubin  0.7  


 


Direct Bilirubin  0.4  


 


AST  31  


 


ALT  21  


 


Alkaline Phosphatase  48  


 


Total Protein  6.9  


 


Albumin  3.1  


 


Globulin  3.8  


 


Albumin/Globulin Ratio  0.8 L  


 


Urine Color   Yellow 


 


Urine Appearance   Turbid 


 


Urine pH   7.0 


 


Ur Specific Gravity   1.015 


 


Urine Protein   Negative 


 


Urine Glucose (UA)   Negative 


 


Urine Ketones   Negative 


 


Urine Blood   Trace-intact H 


 


Urine Nitrate   Negative 


 


Urine Bilirubin   Negative 


 


Urine Urobilinogen   >=8.0 


 


Ur Leukocyte Esterase   Moderate H 


 


Urine RBC   0 - 2 


 


Urine WBC   Tntc 


 


Urine Bacteria   Few 


 


Digoxin    0.9














  02/05/18





  10:50


 


WBC 


 


RBC 


 


Hgb 


 


Hct 


 


MCV 


 


MCH 


 


MCHC 


 


RDW 


 


Plt Count 


 


MPV 


 


Gran % 


 


Lymph % (Auto) 


 


Mono % (Auto) 


 


Eos % (Auto) 


 


Baso % (Auto) 


 


Gran # 


 


Lymph # (Auto) 


 


Mono # (Auto) 


 


Eos # (Auto) 


 


Baso # (Auto) 


 


PT 


 


INR 


 


APTT 


 


Sodium 


 


Potassium 


 


Chloride 


 


Carbon Dioxide 


 


Anion Gap 


 


BUN 


 


Creatinine 


 


Est GFR ( Amer) 


 


Est GFR (Non-Af Amer) 


 


Random Glucose 


 


Lactic Acid  1.5


 


Uric Acid 


 


Calcium 


 


Phosphorus 


 


Magnesium 


 


Total Bilirubin 


 


Direct Bilirubin 


 


AST 


 


ALT 


 


Alkaline Phosphatase 


 


Total Protein 


 


Albumin 


 


Globulin 


 


Albumin/Globulin Ratio 


 


Urine Color 


 


Urine Appearance 


 


Urine pH 


 


Ur Specific Gravity 


 


Urine Protein 


 


Urine Glucose (UA) 


 


Urine Ketones 


 


Urine Blood 


 


Urine Nitrate 


 


Urine Bilirubin 


 


Urine Urobilinogen 


 


Ur Leukocyte Esterase 


 


Urine RBC 


 


Urine WBC 


 


Urine Bacteria 


 


Digoxin 














Assessment & Plan





- Assessment and Plan (Free Text)


Plan: 





Assessment


systemic inflammatory response syndrome, R/O sepsis source to be determined


CVA


HTN


dyslipidemia


atrial fibrillation


S/P pacemaker placement





Plan


Patient given a dose of IV Vanco and started Cefepime pending CXR, PCT, blood, 

urine cx, rapid Influenza test


will monitor clinically

## 2018-02-06 VITALS — RESPIRATION RATE: 18 BRPM

## 2018-02-06 LAB
ALBUMIN SERPL-MCNC: 3 G/DL (ref 3–4.8)
ALBUMIN/GLOB SERPL: 0.8 {RATIO} (ref 1.1–1.8)
ALT SERPL-CCNC: 23 U/L (ref 7–56)
APTT BLD: 28.4 SECONDS (ref 25.1–36.5)
AST SERPL-CCNC: 31 U/L (ref 17–59)
BASOPHILS # BLD AUTO: 0.01 K/MM3 (ref 0–2)
BASOPHILS NFR BLD: 0.2 % (ref 0–3)
BILIRUB DIRECT SERPL-MCNC: 0.4 MG/DL (ref 0–0.4)
BUN SERPL-MCNC: 29 MG/DL (ref 7–21)
CALCIUM SERPL-MCNC: 9 MG/DL (ref 8.4–10.5)
EOSINOPHIL # BLD: 0.2 10*3/UL (ref 0–0.7)
EOSINOPHIL NFR BLD: 2.6 % (ref 1.5–5)
ERYTHROCYTE [DISTWIDTH] IN BLOOD BY AUTOMATED COUNT: 13.2 % (ref 11.5–14.5)
GFR NON-AFRICAN AMERICAN: > 60
GRANULOCYTES # BLD: 4.42 10*3/UL (ref 1.4–6.5)
GRANULOCYTES NFR BLD: 67.7 % (ref 50–68)
HGB BLD-MCNC: 9.8 G/DL (ref 14–18)
INR PPP: 1.43 (ref 0.93–1.08)
LYMPHOCYTES # BLD: 1.1 10*3/UL (ref 1.2–3.4)
LYMPHOCYTES NFR BLD AUTO: 16.3 % (ref 22–35)
MAGNESIUM SERPL-MCNC: 1.9 MG/DL (ref 1.7–2.2)
MCH RBC QN AUTO: 29.8 PG (ref 25–35)
MCHC RBC AUTO-ENTMCNC: 32.6 G/DL (ref 31–37)
MCV RBC AUTO: 91.5 FL (ref 80–105)
MONOCYTES # BLD AUTO: 0.9 10*3/UL (ref 0.1–0.6)
MONOCYTES NFR BLD: 13.2 % (ref 1–6)
PLATELET # BLD: 139 10^3/UL (ref 120–450)
PMV BLD AUTO: 11.3 FL (ref 7–11)
PROTHROMBIN TIME: 16.6 SECONDS (ref 9.4–12.5)
RBC # BLD AUTO: 3.29 10^6/UL (ref 3.5–6.1)
WBC # BLD AUTO: 6.5 10^3/UL (ref 4.5–11)

## 2018-02-06 RX ADMIN — DIGOXIN SCH MG: 0.12 TABLET ORAL at 14:51

## 2018-02-06 RX ADMIN — POLYETHYLENE GLYCOL 3350 SCH: 17 POWDER, FOR SOLUTION ORAL at 09:28

## 2018-02-06 RX ADMIN — PANTOPRAZOLE SODIUM SCH MG: 20 TABLET, DELAYED RELEASE ORAL at 05:26

## 2018-02-06 RX ADMIN — CEFEPIME SCH MLS/HR: 1 INJECTION, SOLUTION INTRAVENOUS at 09:27

## 2018-02-06 RX ADMIN — CEFEPIME SCH MLS/HR: 1 INJECTION, SOLUTION INTRAVENOUS at 22:02

## 2018-02-06 RX ADMIN — PANTOPRAZOLE SODIUM SCH MG: 20 TABLET, DELAYED RELEASE ORAL at 18:30

## 2018-02-06 NOTE — CP.PCM.PN
Subjective





- Date & Time of Evaluation


Date of Evaluation: 02/06/18


Time of Evaluation: 11:25





- Subjective


Subjective: 


Comfortable in bed, no fevers since yesterday morning, no nausea, no abdominal 

pain, no diarrhea.





Objective





- Vital Signs/Intake and Output


Vital Signs (last 24 hours): 


 











Temp Pulse Resp BP Pulse Ox


 


 98.7 F   70   18   93/49 L  94 L


 


 02/06/18 06:00  02/06/18 06:00  02/06/18 06:00  02/06/18 06:00  02/06/18 06:00








Intake and Output: 


 











 02/06/18 02/06/18





 06:59 18:59


 


Output Total 800 


 


Balance -800 














- Medications


Medications: 


 Current Medications





Acetaminophen (Tylenol 325mg Tab)  650 mg PO Q6H PRN


   PRN Reason: FOR FEVER >=99.5F


Aspirin (Aspirin Chewable)  81 mg PO 0800 [facility]


   PRN Reason: Protocol


   Last Admin: 02/05/18 08:21 Dose:  81 mg


Digoxin (Lanoxin)  0.125 mg PO 1400 [facility]


   PRN Reason: Protocol


   Last Admin: 02/05/18 14:08 Dose:  0.125 mg


Donepezil HCl (Aricept)  5 mg PO HS [facility]


   Last Admin: 02/05/18 23:09 Dose:  5 mg


Cefepime HCl (Maxipime 1gm)  1 gm in 100 mls @ 100 mls/hr IVPB Q12 [facility]


   PRN Reason: Protocol


   Last Admin: 02/05/18 21:58 Dose:  100 mls/hr


Lisinopril (Zestril)  2.5 mg PO DAILY [facility]


   PRN Reason: Protocol


   Last Admin: 02/05/18 12:53 Dose:  2.5 mg


Midodrine (Proamatine)  10 mg PO TID [facility]


Pantoprazole Sodium (Protonix Ec Tab)  20 mg PO 0600,1600 [facility]


   Last Admin: 02/06/18 05:26 Dose:  20 mg


Pneumococcal Polyvalent Vaccine (Pneumovax 23 Vaccine)  0.5 ml IM .ONCE ONE


   Stop: 02/06/18 10:01


Polyethylene Glycol (Miralax)  17 gm PO DAILY [facility]


   Last Admin: 02/05/18 12:53 Dose:  17 gm


Tamsulosin HCl (Flomax)  0.4 mg PO 1830 [facility]


   PRN Reason: Protocol


   Last Admin: 02/05/18 17:29 Dose:  0.4 mg


Warfarin Sodium (Coumadin)  4 mg PO 1800 [facility]


   PRN Reason: Protocol


   Last Admin: 02/05/18 17:24 Dose:  4 mg











- Labs


Labs: 


 





 02/06/18 06:40 





 02/06/18 06:40 





 











PT  16.6 SECONDS (9.4-12.5)  H  02/06/18  06:40    


 


INR  1.43  (0.93-1.08)  H  02/06/18  06:40    


 


APTT  28.4 Seconds (25.1-36.5)   02/06/18  06:40    














- Constitutional


Appears: Non-toxic





- Head Exam


Head Exam: NORMAL INSPECTION





- ENT Exam


ENT Exam: Mucous Membranes Moist





- Neck Exam


Neck Exam: absent: Meningismus





- Respiratory Exam


Respiratory Exam: Decreased Breath Sounds





- Cardiovascular Exam


Cardiovascular Exam: +S1, +S2





- GI/Abdominal Exam


GI & Abdominal Exam: Soft.  absent: Tenderness





Assessment and Plan





- Assessment and Plan (Free Text)


Plan: 





Assessment


systemic inflammatory response syndrome, R/O sepsis from UTI (complicated) in 

this patient with Calle catheter


CVA


HTN


dyslipidemia


atrial fibrillation


S/P pacemaker placement





Plan


continue Cefepime day 2; CXR does not show pneumonia; blood cx negative x 1 day

; follow up urine cx, rapid Influenza test


will continue to monitor clinically

## 2018-02-07 VITALS
SYSTOLIC BLOOD PRESSURE: 113 MMHG | DIASTOLIC BLOOD PRESSURE: 53 MMHG | TEMPERATURE: 99.5 F | HEART RATE: 60 BPM | OXYGEN SATURATION: 97 %

## 2018-02-07 VITALS — HEART RATE: 68 BPM

## 2018-02-07 RX ADMIN — PANTOPRAZOLE SODIUM SCH MG: 20 TABLET, DELAYED RELEASE ORAL at 06:56

## 2018-02-07 RX ADMIN — POLYETHYLENE GLYCOL 3350 SCH GM: 17 POWDER, FOR SOLUTION ORAL at 09:40

## 2018-02-07 RX ADMIN — CEFAZOLIN SCH MLS/HR: 330 INJECTION, POWDER, FOR SOLUTION INTRAMUSCULAR; INTRAVENOUS at 17:48

## 2018-02-07 RX ADMIN — DIGOXIN SCH MG: 0.12 TABLET ORAL at 14:50

## 2018-02-07 RX ADMIN — PANTOPRAZOLE SODIUM SCH MG: 20 TABLET, DELAYED RELEASE ORAL at 17:51

## 2018-02-07 RX ADMIN — CEFAZOLIN SCH MLS/HR: 330 INJECTION, POWDER, FOR SOLUTION INTRAMUSCULAR; INTRAVENOUS at 09:39

## 2018-02-07 NOTE — PN
DATE:  02/06/2018



GENITOURINARY PROGRESS NOTE



SUBJECTIVE:  Patient is now seen on the Transitional Care Unit at [facility].  He was seen during his acute admission.  Patient had

urinary retention and a Calle catheter was placed.  Plan was for the

patient to receive a voiding trial, but he has not done that as of yet. 

There was also some fullness noted on prior imaging and we will going to

plan for a repeat renal ultrasound.  Patient is now ambulating and feeling

better and stronger.  His GFR is greater than 60.



PHYSICAL EXAMINATION:

VITAL SIGNS:  He has been afebrile.  Temperature of 98.7, pulse of 70, BP

93/49, respirations 18.

ABDOMEN:  Soft, nontender, nondistended.  There is no CVA tenderness. 

Phallus is normal.  There is a Calle catheter in place, draining clear

colored urine.



IMPRESSION AND PLAN:  This is an 89-year-old male with urinary retention. 

Urologically, patient has been stable with the Calle catheter in.  He has

been maintained on tamsulosin daily.  Plan will be to remove his Calle

catheter tomorrow morning for a voiding trial.  If the patient is voiding,

he will need a postvoid residual checked prior to him being discharged.  If

patient is unable to void in 8 hours, Calle catheter should be replaced and

patient can be discharged home with an indwelling Calle catheter.  Patient

will then need to follow up in my office to consider monthly Calle changes

versus possible surgical intervention, but we will need to discuss that

with patient's family as well as his medical physician.



Thank you for allowing me to participate in the care of this patient.  We

will follow him with you.





__________________________________________

Micheal West MD





DD:  02/06/2018 16:49:38

DT:  02/06/2018 16:52:48

Job # [id number]

## 2018-02-07 NOTE — PN
DATE:  02/06/2018



SUBJECTIVE:  The patient is alert, awake, responsive.  Overnight nurse's

notes were reviewed.  The patient was still found to be alert, awake,

oriented x2.  No adverse events documented overnight.  The patient slept

overnight.  The patient was seen by the physical therapist.  The patient

was seen by  who met with the patient's son regarding the

discharge.  The patient discharge planning is home with home care with

visiting nurse.



PHYSICAL EXAMINATION:

VITAL SIGNS:  T-max 98.7 to 98.6.  T-max in the last 24 hour 101.1 down to

98.9, 98.7, pulse 69 to 73, blood pressure in the last 24 hours 90/46,

100/46, 93/49, 96/41, respiration 18, O2 sat is 94-97%.

HEAD:  Examination normocephalic, atraumatic.

HEENT:  Examination shows pinkish pale conjunctivae.  Anicteric sclerae. 

No oropharyngeal lesion.  No neck rigidity.  No visible jugular distention.

Soft carotid bruit.

CHEST:  Kyphosis.

LUNG:  Examination shows no rales, crackles or wheezing.  Positive left

upper chest AICD.

CARDIOVASCULAR:  S1, S2, regular rhythm.  Positive systolic murmur left

sternal border, right second intercostal space, left second intercostal

space.

ABDOMEN:  Soft.  Positive bowel sounds.

GENITALIA:  Male.  Positive Calle catheter.

EXTREMITY:  Shows no pitting, no calf numbness, no Homans' sign.

MUSCULOSKELETAL:  Examination shows a body mass index of 21.2.

NEUROLOGIC:  The patient is alert, awake, oriented x2.  Cranial nerves II

through XII intact and limited.  Gait examination not tested.

VASCULAR:  Examination palpable pulses.



DIAGNOSTICS:  On 02/06/2018, WBC 6.5, hemoglobin/hematocrit 9.8, 30.0,

platelet 139.  Granulocytes are normal.  PT is up to 16.6, INR 1.43. 

Sodium 139, potassium 4.2, chloride 104, CO2 25, anion gap 14, BUN 29,

creatinine 1.1, GFR greater than 60, glucose 98, lactic acid is 1.5.  LFTs

are normal.  Procalcitonin less than 0.05.  Digoxin ____ level.  Influenza

is negative.  Urine cultures are Gram-negative roberth; identification and

sensitivity pending.



IMPRESSION AND PLAN

1.  Deconditioning.

2.  Gait dysfunction.

3.  New fever of 101.

4.  Hypotension.

5.  Normocytic anemia.

6.  Coumadin dependent atrial fibrillation.

7.  Mild prerenal kidney injury.

8.  Gram-negative roberth urinary tract infection with microscopic hematuria,

pyuria, bacteriuria.

9.  Urinary retention.

10.  Prostatic hypertrophy.

11.  Possible bladder outlet obstruction.

12.  Systemic inflammatory response syndrome.

13.  Gram-negative roberth urinary tract infection complicated secondary to

Calle catheter placement, secondary to bladder outlet obstruction and

urinary retention.

14.  Status post automated implantable cardioverter defibrillator implant.

15.  Episodic confusion and forgetfulness.

16.  Possible dementia.

17.  Prostatic hypertrophy.

18.  Constipation.

19.  Hypotension.



PLAN:  At this time, we are awaiting for the final identification and

sensitivity on the urinary cultures.  Current consultations Cardiology,

Gastroenterology, Infectious Disease, Neurology and Urology.  The patient's

current medications; the patient was started on Aricept 5 mg at bedtime,

Ecotrin 81 daily, Coumadin 4 mg daily, Flomax 0.4 mg daily, digoxin 0.125

daily, cefepime 1 g IV q. 12, MiraLax 17 g daily, the patient is on Namenda

5 mg twice a day, midodrine, ProAmatine 10 mg three times a day, Protonix

40 mg daily, Tylenol 650 q. 6 p.r.n. Patient received a dose of vancomycin

1 g yesterday, Zestril 2.5 mg daily.  The patient was seen by Neurology. 

EEG ordered.  Heart-healthy diet, out of bed, SCDs, KARI stockings, physical

therapy, occupational therapy ordered.  The patient was seen by the

physical therapist.  Their evaluation from today was reviewed.



RECOMMENDATIONS:  Home with PT.  The patient's son met with the social

worker.



DISCHARGE PLAN:  The patient will return home with the son with home care

referral with [facility].



We will await further recommendations regarding patient's antibiotic to be

switched over to p.o. antibiotic upon discharge by Infectious Disease.







__________________________________________

Phong Cordon MD



DD:  02/06/2018 22:27:57

DT:  02/06/2018 22:34:22

Job # [id number]

## 2018-02-07 NOTE — CP.PCM.PN
Subjective





- Date & Time of Evaluation


Date of Evaluation: 02/07/18


Time of Evaluation: 11:00





- Subjective


Subjective: 





Comfortable, no fevers.





Objective





- Vital Signs/Intake and Output


Vital Signs (last 24 hours): 


 











Temp Pulse Resp BP Pulse Ox


 


 98.2 F   69   18   148/99 H  97 


 


 02/06/18 17:58  02/06/18 17:58  02/06/18 17:58  02/06/18 17:58  02/06/18 17:58








Intake and Output: 


 











 02/07/18 02/07/18





 06:59 18:59


 


Output Total  500


 


Balance  -500














- Medications


Medications: 


 Current Medications





Acetaminophen (Tylenol 325mg Tab)  650 mg PO Q6H PRN


   PRN Reason: FOR FEVER >=99.5F


Aspirin (Aspirin Chewable)  81 mg PO 0800 [facility]


   PRN Reason: Protocol


   Last Admin: 02/06/18 09:27 Dose:  81 mg


Digoxin (Lanoxin)  0.125 mg PO 1400 [facility]


   PRN Reason: Protocol


   Last Admin: 02/06/18 14:51 Dose:  0.125 mg


Donepezil HCl (Aricept)  5 mg PO HS [facility]


   Last Admin: 02/06/18 22:03 Dose:  5 mg


Cefazolin Sodium (Ancef 1gm In Ns)  1 gm in 100 mls @ 100 mls/hr IVPB Q8 [facility]


   PRN Reason: Protocol


Lisinopril (Zestril)  2.5 mg PO DAILY [facility]


   PRN Reason: Protocol


   Last Admin: 02/06/18 09:30 Dose:  Not Given


Memantine (Namenda)  5 mg PO BID [facility]


Midodrine (Proamatine)  10 mg PO TID [facility]


   Last Admin: 02/06/18 18:30 Dose:  10 mg


Pantoprazole Sodium (Protonix Ec Tab)  20 mg PO 0600,1600 [facility]


   Last Admin: 02/07/18 06:56 Dose:  20 mg


Polyethylene Glycol (Miralax)  17 gm PO DAILY [facility]


   Last Admin: 02/06/18 09:28 Dose:  Not Given


Tamsulosin HCl (Flomax)  0.4 mg PO 1830 [facility]


   PRN Reason: Protocol


   Last Admin: 02/06/18 18:28 Dose:  0.4 mg


Warfarin Sodium (Coumadin)  4 mg PO 1800 [facility]


   PRN Reason: Protocol


   Last Admin: 02/06/18 18:27 Dose:  4 mg











- Labs


Labs: 


 





 02/06/18 06:40 





 02/06/18 06:40 





 











PT  16.6 SECONDS (9.4-12.5)  H  02/06/18  06:40    


 


INR  1.43  (0.93-1.08)  H  02/06/18  06:40    


 


APTT  28.4 Seconds (25.1-36.5)   02/06/18  06:40    














- Constitutional


Appears: Non-toxic





- Head Exam


Head Exam: NORMAL INSPECTION





- ENT Exam


ENT Exam: Mucous Membranes Moist





- Neck Exam


Neck Exam: absent: Meningismus





- Respiratory Exam


Respiratory Exam: Decreased Breath Sounds





- Cardiovascular Exam


Cardiovascular Exam: +S1, +S2





- GI/Abdominal Exam


GI & Abdominal Exam: Soft.  absent: Tenderness





Assessment and Plan





- Assessment and Plan (Free Text)


Plan: 





Assessment


sepsis from UTI (complicated) in this patient with Calle catheter, growing 

Citrobacter


CVA


HTN


dyslipidemia


atrial fibrillation


S/P pacemaker placement





Plan


change Cefepime to Cefazolin (day 3 total) - can switch to PO Keflex for 

another 7-10 days


discussed with Dr. Cordon

## 2018-02-07 NOTE — CON
DATE:



NEUROLOGY CONSULTATION



REASON FOR CONSULTATION:  Episodic forgetfulness.



HISTORY OF PRESENTING ILLNESS:  The patient is an 89-year-old male who has

been asked for evaluation of episodic forgetfulness.  The patient

apparently was admitted to subacute rehabilitation after he had a near

syncopal episode.  The patient has history of atrial fibrillation, on

Coumadin.  The patient said he forgets some things and other things he is

okay with.  He said he does not forget to pay his bills.  Denies any focal

weakness in the arms or legs.



REVIEW OF SYSTEMS:  Denies any headache, dizziness, chest pain, shortness

of breath, abdominal pain, constipation, diarrhea, dysuria, cough, sputum

production.



PAST MEDICAL HISTORY:  Includes atrial fibrillation, hypertension.



CURRENT MEDICATIONS:  Include Aricept 5 mg, aspirin, Coumadin, cefepime,

midodrine, Protonix, lisinopril, and Tylenol.



ALLERGIES:  NO KNOWN DRUG ALLERGIES.



SOCIAL HISTORY:  The patient denies smoking, use of alcohol or illicit

drugs.



FAMILY HISTORY:  Reviewed and noncontributory to the case.



PHYSICAL EXAMINATION:

GENERAL:  The patient is an elderly male, lying on the bed, in no acute

distress.

VITAL SIGNS:  His blood pressure is 148/99, heart rate is 69 per minute,

breathing at the rate of 16 per minute, temperature is 98.2 degrees

Fahrenheit.

HEENT:  Head is normocephalic, atraumatic.

NECK:  Supple.  There are no carotid bruits.

LUNGS:  Clear.

CVS:  S1 and S2 are audible.  No murmurs.

ABDOMEN:  Soft and nontender.  Bowel sounds are present.

NEUROLOGIC:  Mental status:  The patient is awake and alert.  He knows he

is in the hospital.  The year is 2008, month is August.  He does not know

the name of the president.  He was unable to do serial 7.  His recent

memory is 1/3 in 5 minutes.  Cranial nerves examination:  Pupils are 3 mm

bilaterally reactive to light.  Visual fields are full.  Extraocular

movements are intact.  There is no facial asymmetry.  Palate is upgoing

bilaterally and tongue is midline.  Motor examination:  Tone is normal. 

Power is 4/5 bilaterally in all extremities.  Reflexes are 1+ and

symmetrical in upper extremities and absent in the lower extremities. 

Plantars are downgoing bilaterally.  Cerebellar examination: 

Finger-to-nose shows no dysmetria.  Gait is deferred at the moment.



LABORATORY DATA:  Reviewed showed WBC of 6.5, hemoglobin 9.8, hematocrit

30.1, and platelets of 139.  Sodium is 139, potassium 4.2, chloride 104,

carbon dioxide 25, BUN of 29, creatinine 1.1, and glucose of 98.  He had

the vitamin B12 level done, which was 416.  His T4 and TSH levels are

within normal limits.  He had a CT scan of the head done, which shows no

intracranial abnormality.  Left frontal and anterior parietal lobe cystic

encephalomalacia.  Sequelae of remote MCA territory infarction.  Mild

chronic macroangiopathic changes and mild age-related global atrophy.



IMPRESSION:

1.  Memory loss, which appears to be secondary to underlying dementia.

2.  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

infarction.

3.  History of atrial fibrillation.



RECOMMENDATIONS:

1.  The patient was started on Aricept yesterday, which is to be continued

and after 1 month if he is able to tolerate it, the dose needs to be

increased to 10 mg once a day.

2.  The patient to have an electroencephalogram.

3.  I will also start the patient on Namenda 5 mg twice a day, which can be

increased to 10 mg twice a day after a month.

4.  The patient to be continued on Coumadin for his underlying atrial

fibrillation.

5.  Please continue other treatment and supportive care.



Thank you for the opportunity to participate in the care of this patient.





__________________________________________

Nurys Grant MD



DD:  02/06/2018 19:35:38

DT:  02/07/2018 0:17:46

Job # [id number]

## 2018-02-08 NOTE — DS
HISTORY OF PRESENT ILLNESS:  Patient is to be discharged after completion

of the TCU stay.  In the morning, patient's Calle catheter was removed as

per Urology, Dr. West's order, as per the patient's nurse.  Patient failed

the voiding trial and patient's bladder scan showed more than 405 mL of

urine in the bladder.  Patient's urologist was called.  Urologist ordered

the reinsertion of Calle catheter of 16-[place] citizen with significant urine

output.  At this time, patient's Calle catheter will be connected to the

leg bag upon discharge.  Overnight nurse's notes were reviewed.  Overnight

slept well.



PHYSICAL EXAMINATION:

VITAL SIGNS:  T-max 98.2; heart rate 69, 60, 59, 60; blood pressure 113/53

_____105/47, 148/99; respiration 18; O2 sat 97 to 100%.

INTAKE AND OUTPUT:  Not correctly documented.



DIAGNOSTICS STUDIES:  No labs from today.  Urine cultures, Citrobacter

diversus, sensitive to all antibiotic listed.



FINAL IMPRESSION, PLAN AND DISCHARGE DIAGNOSES:

1.  Recurrent urinary retention with prostatic hypertrophy and voiding

trial failure.

2.  Citrobacter diversus urinary tract infection.

3.  Fever of 101.1.

4.  Deconditioning.

5.  Gait dysfunction.

6.  Status post mechanical fall versus questionable syncope.

7.  Severe rhabdomyolysis.

8.  Acute renal failure (resolved).

9.  Sepsis secondary to urinary tract infection, secondary to complicated

urinary tract infection and Calle catheter-dependent patient.

10.  Hypotension.

11.  Decreased body mass index.

12.  Normocytic anemia.

13.  Coumadin-dependent atrial fibrillation.

14.  Prerenal kidney injury.

15.  Microscopic hematuria, pyuria, bacteriuria.

16.  Prostatic hypertrophy.

17.  Dementia with memory loss.

18.  Constipation.

19.  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

artery territory infarct.



PLAN:  At this time, patient had a reinsertion of Calle catheter with

normal flow of urine.  Patient is cleared by Infectious Disease and all the

subspecialty for discharge.



DISCHARGE MEDICATIONS:  Patient is to be discharged home on following

medications.  Discharge medications are Tylenol 650 q.6 p.r.n., aspirin 81

mg daily, Keflex 500 mg three times a day for 7 days, digoxin 0.125 mg

daily, Aricept 5 mg at bedtime, Drisdol 50,000 units weekly, Zestril 2.5 mg

daily, Namenda 5 mg twice a day, ProAmatine increased to 5 mg three times a

day, Protonix 40 mg daily, MiraLax 17 g daily, Flomax 0.4 mg daily,

Coumadin 4 mg daily.



Patient is discharged home with discharge followup with Dr. Cordon and

Urology within 1 week.  Discharge medications as per the new script and

updated ambulatory orders, which were given to the patient upon discharge. 

[facility] referral to [facility] home [facility].



During this hospitalization, patient was extensively explained about the

details of his complicated medical condition, details of overall guarded

prognosis, which he acknowledged understanding.



Time spent in the entire discharge process, more than 45 minutes.



Dictated and electronically signed, not read.





__________________________________________

Phong Cordon MD



DD:  02/07/2018 20:34:02

DT:  02/07/2018 20:41:00

Job # [id number]

## 2018-02-09 NOTE — EEG
DATE:



ELECTROENCEPHALOGRAM REPORT



INTRODUCTION:  This is a digitally recorded EEG monitoring using standard

EEG montages.



BACKGROUND RHYTHM:  The EEG shows a background activity of 8 Hz alpha

activity in parietooccipital region.  The EEG activity is bilaterally

symmetrical and synchronous.  There is attenuation with background activity

and eye opening.  No sleep recording was noted.



ABNORMAL POTENTIALS:  No spike, sharp waves, or focal slowing was seen.



PHOTIC STIMULATION AND HYPERVENTILATION:  Photic stimulation did not reveal

any abnormality.  Hyperventilation was not performed.



IMPRESSION:  Normal electroencephalogram.  No epileptiform activity is seen

in this electroencephalogram recording.











__________________________________________

Nurys Grant MD



DD:  02/08/2018 9:23:09

DT:  02/09/2018 1:13:33

Job # [id number]

## 2018-03-09 ENCOUNTER — HOSPITAL ENCOUNTER (EMERGENCY)
Dept: HOSPITAL 42 - ED | Age: 83
Discharge: HOME | End: 2018-03-09
Payer: MEDICARE

## 2018-03-09 VITALS — HEART RATE: 68 BPM

## 2018-03-09 VITALS — BODY MASS INDEX: 6.7 KG/M2

## 2018-03-09 VITALS
HEART RATE: 82 BPM | TEMPERATURE: 97.9 F | SYSTOLIC BLOOD PRESSURE: 132 MMHG | OXYGEN SATURATION: 99 % | DIASTOLIC BLOOD PRESSURE: 88 MMHG

## 2018-03-09 VITALS — RESPIRATION RATE: 16 BRPM

## 2018-03-09 DIAGNOSIS — R33.9: Primary | ICD-10-CM

## 2018-03-09 NOTE — ED PDOC
Arrival/HPI





- General


Time Seen by Provider: 03/09/18 13:27


Historian: Patient





- History of Present Illness


Narrative History of Present Illness (Text): 


03/09/18 13:39


Bayron Clayton is an 89 year old male, whose past medical history includes CVA, 

hypertension, Hypercholesteremia, and atrial fibrillation, who presents to the 

emergency department complaining of difficulty urinating. Patient was in the 

TCU for difficulty urinating before, where a Wallis catheter was placed. Patient 

was discharged home 2 days ago with wallis catheter with flomax after he failed 

a voiding trial.  Patient reports that home nurse removed his wallis catheter 

and he has been unable to urinate since then.  Patient denies any fever, chills

, chest pain, shortness of breath, nausea, vomiting, diarrhea, back pain, neck 

pain, headache, dizziness or any other complaints. 





Chart review shows that patient was seen by Dr. West and was recommended to 

continue flomax, get cystoscopy and possibly laser vaporization, and maintain 

wallis.  





03/09/18 14:12





Time/Duration: < week (2 days)


Symptom Onset: Sudden


Symptom Course: Unchanged


Activities at Onset: Light


Context: Home





Past Medical History





- Provider Review


Nursing Documentation Reviewed: Yes





- Infectious Disease


Hx of Infectious Diseases: None





- Tetanus Immunization


Tetanus Immunization: Unknown





- Cardiac


Hx Cardiac Disorders: Yes


Hx Congestive Heart Failure: Yes


Hx Hypertension: Yes





- Neurological


HX Cerebrovascular Accident: Yes





- Hematological/Oncological


Hx Blood Transfusions: No


Hx Blood Transfusion Reaction: No





- Musculoskeletal/Rheumatological


Hx Falls: No





- Psychiatric


Hx Substance Use: No





- Past Surgical History


Past Surgical History: Unable to Obtain





- Anesthesia


Hx Anesthesia Reactions: No


Hx Malignant Hyperthermia: No





- Suicidal Assessment


Feels Threatened In Home Enviroment: No





Family/Social History





- Physician Review


Nursing Documentation Reviewed: Yes


Family/Social History: Unknown Family HX


Smoking Status: Never Smoked


Hx Alcohol Use: No


Hx Substance Use: No


Hx Substance Use Treatment: No





Allergies/Home Meds


Allergies/Adverse Reactions: 


Allergies





No Known Allergies Allergy (Verified 03/09/18 14:01)


 








Home Medications: 


 Home Meds











 Medication  Instructions  Recorded  Confirmed


 


Ergocalciferol (Vitamin D2) 50,000 iu PO WED 10/17/14 03/09/18





[Vitamin D2]   














Review of Systems





- Physician Review


All systems were reviewed & negative as marked: Yes





- Review of Systems


Constitutional: Normal


Eyes: Normal


ENT: Normal


Respiratory: Normal.  absent: SOB, Cough


Cardiovascular: Normal.  absent: Chest Pain, Palpitations


Gastrointestinal: Normal.  absent: Abdominal Pain, Diarrhea, Nausea, Vomiting


Genitourinary Male: Urinary Output Changes (difficulty urinating)


Musculoskeletal: Normal.  absent: Back Pain, Neck Pain


Skin: Normal.  absent: Rash


Neurological: Normal.  absent: Headache, Dizziness


Endocrine: Normal


Hemo/Lymphatic: Normal


Psychiatric: Normal





Physical Exam





- Physical Exam


Narrative Physical Exam (Text): 


03/09/18 13:45


Constitutional: No acute distress.


Head: Normocephalic.  Atraumatic.  


Eyes:  PERRL.


ENT:  Moist mucous membranes.


Neck:  Supple.


Cardiovascular:  Irregularly Regular rate.


Chest: No tenderness.


Respiratory:  Clear to auscultation bilaterally.


GI:  Soft. Nontender. Nondistended. 


Back:  No CVA tenderness.


Musculoskeletal:  No tenderness or swelling of extremities.


Skin:  No rash. 


Neurologic:  Alert, no focal deficit.





Vital Signs











  Temp Pulse Resp BP Pulse Ox


 


 03/09/18 13:56  98.6 F  88  16  141/78  92 L














Medical Decision Making


ED Course and Treatment: 


03/09/18 13:46


Impression:


89 year old male presenting to the emergency department complaining of 

difficulty urinating for 2 days. Previously failed voiding trial.  Has urology 

follow-up





Plan:


-- Leg Bag


-- Urinary Catheter


-- Reassess and disposition





Prior Visits:


Notes and results from previous visits were reviewed. Patient was last seen in 

the emergency department on 01/24/18 for Rhabdomyelitis. Patient was 

hospitalized.





Progress Notes:











03/09/18 14:13


Wallis catheter put out >500cc urine.  Patient discharged with leg bag.


03/09/18 14:46








- Scribe Statement


The provider has reviewed the documentation as recorded by the Jasmine Whitfield





All medical record entries made by the Jasmine were at my direction and 

personally dictated by me. I have reviewed the chart and agree that the record 

accurately reflects my personal performance of the history, physical exam, 

medical decision making, and the department course for this patient. I have 

also personally directed, reviewed, and agree with the discharge instructions 

and disposition.








Disposition/Present on Arrival





- Present on Arrival


Any Indicators Present on Arrival: No


History of DVT/PE: No


History of Uncontrolled Diabetes: No


Urinary Catheter: Yes


History Surgical Site Infection Following: None





- Disposition


Have Diagnosis and Disposition been Completed?: Yes


Diagnosis: 


 Urinary retention





Disposition: HOME/ ROUTINE


Disposition Time: 14:14


Patient Plan: Discharge


Patient Problems: 


 Current Active Problems











Problem Status Onset


 


Urinary retention Acute  











Condition: GOOD


Discharge Instructions (ExitCare):  Wallis Catheter, Male, Urinary Retention


Additional Instructions: 


Follow-up with Dr. West within 1 week.  Return to ED if condition worsens.  

Follow-up with PMD within 2 days.  Do not remove wallis


Referrals: 


Nasim Shafer MD [Primary Care Provider] - Follow up with primary


Micheal West MD [Staff Provider] - Follow up with primary

## 2018-04-20 ENCOUNTER — HOSPITAL ENCOUNTER (EMERGENCY)
Dept: HOSPITAL 42 - ED | Age: 83
Discharge: HOME | End: 2018-04-20
Payer: MEDICARE

## 2018-04-20 VITALS — DIASTOLIC BLOOD PRESSURE: 69 MMHG | SYSTOLIC BLOOD PRESSURE: 118 MMHG | HEART RATE: 75 BPM

## 2018-04-20 VITALS — TEMPERATURE: 98 F | OXYGEN SATURATION: 97 %

## 2018-04-20 VITALS — RESPIRATION RATE: 18 BRPM

## 2018-04-20 VITALS — BODY MASS INDEX: 6.7 KG/M2

## 2018-04-20 VITALS — HEART RATE: 68 BPM

## 2018-04-20 DIAGNOSIS — Z46.6: Primary | ICD-10-CM

## 2018-04-20 DIAGNOSIS — N39.0: ICD-10-CM

## 2018-04-20 LAB
AMORPH SED URNS QL MICRO: (no result)
APPEARANCE UR: CLEAR
BACTERIA #/AREA URNS HPF: (no result) /[HPF]
BILIRUB UR-MCNC: NEGATIVE MG/DL
COLOR UR: YELLOW
EPI CELLS #/AREA URNS HPF: (no result) /HPF (ref 0–5)
GLUCOSE UR STRIP-MCNC: NEGATIVE MG/DL
LEUKOCYTE ESTERASE UR-ACNC: (no result) LEU/UL
PH UR STRIP: 5.5 [PH] (ref 4.7–8)
PROT UR STRIP-MCNC: 100 MG/DL
RBC # UR STRIP: (no result) /UL
RBC #/AREA URNS HPF: (no result) /HPF (ref 0–2)
SP GR UR STRIP: >= 1.03 (ref 1–1.03)
UROBILINOGEN UR STRIP-ACNC: 0.2 E.U./DL
WBC #/AREA URNS HPF: (no result) /HPF (ref 0–6)

## 2018-04-20 NOTE — ED PDOC
Arrival/HPI





- General


Chief Complaint: Male Genitourinary


Time Seen by Provider: 04/20/18 10:21


Historian: Patient





- History of Present Illness


Narrative History of Present Illness (Text): 





04/20/18 10:56


88 y/o male, pmh including cva/htn/hyperlipidemia/a.fibb on coumadin, nkda, 

been on the wallis cathether for couple months which he stated that he needs to 

have it in for the rest of his life.  Pt. stated that he is due for the wallis 

catheter change today by his RN but has not seen the RN so he came to the ER 

for evaluation.  Pt. stated that he has mild irritation on the urethra region. 

Pt. has been eating and drinking well, no recent change on coumadin level and 

the pmd has been keeping track of the INR level for him.  Pt. has no chest pain 

or flank pain, no fever or chills, no night sweat, no rash, no other medical or 

psychological complaints. 








Past Medical History





- Provider Review


Nursing Documentation Reviewed: Yes





- Infectious Disease


Hx of Infectious Diseases: None





- Tetanus Immunization


Tetanus Immunization: Unknown





- Cardiac


Hx Cardiac Disorders: Yes


Hx Atrial Fibrillation: Yes


Hx Congestive Heart Failure: Yes


Hx Hypertension: Yes





- Pulmonary


Hx Respiratory Disorders: No





- Neurological


Hx Neurological Disorder: Yes


HX Cerebrovascular Accident: Yes





- HEENT


Hx HEENT Disorder: No





- Renal


Hx Renal Disorder: No





- Endocrine/Metabolic


Hx Endocrine Disorders: No





- Hematological/Oncological


Hx Blood Disorders: No





- Integumentary


Hx Dermatological Disorder: No





- Musculoskeletal/Rheumatological


Hx Musculoskeletal Disorders: No





- Gastrointestinal


Hx Gastrointestinal Disorders: No





- Genitourinary/Gynecological


Hx Genitourinary Disorders: Yes (urine retention)


Hx Reproductive Disorders: Yes (BPH)





- Psychiatric


Hx Psychophysiologic Disorder: No


Hx Substance Use: No





- Past Surgical History


Past Surgical History: Unable to Obtain





- Surgical History


Other/Comment: PROSTATE





- Anesthesia


Hx Anesthesia Reactions: No


Hx Malignant Hyperthermia: No





- Suicidal Assessment


Feels Threatened In Home Enviroment: No





Family/Social History





- Physician Review


Nursing Documentation Reviewed: Yes


Family/Social History: Unknown Family HX


Smoking Status: Never Smoked


Hx Alcohol Use: No


Hx Substance Use: No


Hx Substance Use Treatment: No





Allergies/Home Meds


Allergies/Adverse Reactions: 


Allergies





No Known Allergies Allergy (Verified 04/20/18 10:03)


 








Home Medications: 


 Home Meds











 Medication  Instructions  Recorded  Confirmed


 


Ergocalciferol (Vitamin D2) 50,000 iu PO WED 10/17/14 03/09/18





[Vitamin D2]   














Review of Systems





- Review of Systems


Constitutional: absent: Fatigue, Fevers


Eyes: absent: Vision Changes


ENT: absent: Hearing Changes


Respiratory: absent: SOB, Cough


Cardiovascular: absent: Chest Pain


Gastrointestinal: absent: Abdominal Pain, Nausea, Vomiting


Musculoskeletal: absent: Arthralgias, Back Pain


Skin: absent: Rash


Psychiatric: absent: Anxiety, Depression





Physical Exam


Vital Signs Reviewed: Yes


Vital Signs











  Temp Pulse Resp BP Pulse Ox


 


 04/20/18 12:50   75  18  118/69  97


 


 04/20/18 11:21   79  18  121/71  97


 


 04/20/18 10:06  98.0 F  84  16  119/69  97











Temperature: Afebrile


Blood Pressure: Normal


Pulse: Regular


Respiratory Rate: Normal


Appearance: Positive for: Well-Appearing, Non-Toxic, Comfortable


Pain Distress: None


Mental Status: Positive for: Alert and Oriented X 3





- Systems Exam


Head: Present: Atraumatic, Normocephalic


Pupils: Present: PERRL


Extroacular Muscles: Present: EOMI


Conjunctiva: Present: Normal


Mouth: Present: Moist Mucous Membranes


Neck: Present: Normal Range of Motion


Respiratory/Chest: Present: Clear to Auscultation, Good Air Exchange.  No: 

Respiratory Distress, Accessory Muscle Use


Cardiovascular: Present: Regular Rate and Rhythm, Normal S1, S2.  No: Murmurs


Abdomen: No: Tenderness, Distention, Peritoneal Signs


Genitourinary Male: Present: Normal External Genitalia, Circumcised Penis, 

Other (visible leg bag wallis noted on the wallis inserted to urethra region, 

there is no hematuria).  No: Lesions, Penile Discharge, Testicle Tenderness, 

Penile Swelling, Masses, Erythema, Hernias, Testicle Swelling


Back: Present: Normal Inspection


Upper Extremity: Present: Normal Inspection.  No: Cyanosis, Edema


Lower Extremity: Present: Normal Inspection.  No: Edema


Neurological: Present: GCS=15, Speech Normal, Motor Func Grossly Intact, Gait 

Normal, Memory Normal


Skin: Present: Warm, Dry, Normal Color.  No: Rashes


Psychiatric: Present: Alert, Oriented x 3, Normal Insight, Normal Concentration





Medical Decision Making


ED Course and Treatment: 





04/20/18 11:12


-Old wallis remove and change with new one, will obtain UA from the new wallis


-Observe and reassess





04/20/18 13:09


-UA show +UTI


-New wallis inserted and comfortable, eating and drinking well. 


-urine culture from 02/2018 show positive which sensitive to the cefazolin


-Discharge home with keflex, new wallis, please follow up with your own pmd and 

urologist within  48 hours, return to the ER for any new or worsening signs or 

symptoms. 





- Lab Interpretations


Lab Results: 


 Lab Results





04/20/18 12:10: Urine Color Yellow, Urine Appearance Clear, Urine pH 5.5, Ur 

Specific Gravity >= 1.030, Urine Protein 100 H, Urine Glucose (UA) Negative, 

Urine Ketones Negative, Urine Blood Large H, Urine Nitrate Positive H, Urine 

Bilirubin Negative, Urine Urobilinogen 0.2, Ur Leukocyte Esterase Moderate H, 

Urine RBC Tntc, Urine WBC Tntc, Ur Epithelial Cells None, Amorphous Sediment Few

, Urine Bacteria Mod








I have reviewed the lab results: Yes





- PA / NP / Resident Statement


MD/DO has reviewed & agrees with the documentation as recorded.





Disposition/Present on Arrival





- Present on Arrival


Any Indicators Present on Arrival: No


History of DVT/PE: No


History of Uncontrolled Diabetes: No


Urinary Catheter: Yes


History of Decub. Ulcer: No


History Surgical Site Infection Following: None





- Disposition


Have Diagnosis and Disposition been Completed?: Yes


Diagnosis: 


 Wallis catheter problem, UTI (urinary tract infection)





Disposition: HOME/ ROUTINE


Disposition Time: 11:13


Patient Plan: Discharge


Patient Problems: 


 Current Active Problems











Problem Status Onset


 


Wallis catheter problem Acute  











Condition: GOOD


Additional Instructions: 


-Discharge home with keflex, new wallis, please follow up with your own pmd and 

urologist within  48 hours, return to the ER for any new or worsening signs or 

symptoms. 


Prescriptions: 


Cephalexin [cephalexin] 500 mg PO QID PRN #[id number] cap


 PRN Reason: Other


Referrals: 


Micheal West MD [Staff Provider] - Follow up with primary


Forms:  WORK NOTE

## 2018-05-23 ENCOUNTER — HOSPITAL ENCOUNTER (EMERGENCY)
Dept: HOSPITAL 42 - ED | Age: 83
Discharge: HOME | End: 2018-05-23
Payer: MEDICARE

## 2018-05-23 VITALS — SYSTOLIC BLOOD PRESSURE: 127 MMHG | TEMPERATURE: 97.6 F | DIASTOLIC BLOOD PRESSURE: 81 MMHG | HEART RATE: 72 BPM

## 2018-05-23 VITALS — RESPIRATION RATE: 18 BRPM

## 2018-05-23 VITALS — OXYGEN SATURATION: 99 %

## 2018-05-23 VITALS — BODY MASS INDEX: 6.7 KG/M2

## 2018-05-23 VITALS — HEART RATE: 68 BPM

## 2018-05-23 DIAGNOSIS — N39.0: ICD-10-CM

## 2018-05-23 DIAGNOSIS — Z95.0: ICD-10-CM

## 2018-05-23 DIAGNOSIS — N40.0: ICD-10-CM

## 2018-05-23 DIAGNOSIS — Z79.01: ICD-10-CM

## 2018-05-23 DIAGNOSIS — E78.5: ICD-10-CM

## 2018-05-23 DIAGNOSIS — I10: ICD-10-CM

## 2018-05-23 DIAGNOSIS — I48.91: ICD-10-CM

## 2018-05-23 DIAGNOSIS — Z86.73: ICD-10-CM

## 2018-05-23 DIAGNOSIS — R55: Primary | ICD-10-CM

## 2018-05-23 LAB
ALBUMIN SERPL-MCNC: 3.8 G/DL (ref 3–4.8)
ALBUMIN/GLOB SERPL: 1.1 {RATIO} (ref 1.1–1.8)
ALT SERPL-CCNC: 16 U/L (ref 7–56)
AMORPH SED URNS QL MICRO: (no result)
APPEARANCE UR: CLEAR
AST SERPL-CCNC: 27 U/L (ref 17–59)
BACTERIA #/AREA URNS HPF: (no result) /[HPF]
BASOPHILS # BLD AUTO: 0.01 K/MM3 (ref 0–2)
BASOPHILS NFR BLD: 0.3 % (ref 0–3)
BILIRUB UR-MCNC: NEGATIVE MG/DL
BNP SERPL-MCNC: 4720 PG/ML (ref 0–450)
BUN SERPL-MCNC: 21 MG/DL (ref 7–21)
CALCIUM SERPL-MCNC: 9.4 MG/DL (ref 8.4–10.5)
COLOR UR: YELLOW
EOSINOPHIL # BLD: 0 10*3/UL (ref 0–0.7)
EOSINOPHIL NFR BLD: 0.8 % (ref 1.5–5)
EPI CELLS #/AREA URNS HPF: (no result) /HPF (ref 0–5)
ERYTHROCYTE [DISTWIDTH] IN BLOOD BY AUTOMATED COUNT: 13.8 % (ref 11.5–14.5)
GFR NON-AFRICAN AMERICAN: 52
GLUCOSE UR STRIP-MCNC: NEGATIVE MG/DL
GRANULOCYTES # BLD: 2.27 10*3/UL (ref 1.4–6.5)
GRANULOCYTES NFR BLD: 61.2 % (ref 50–68)
HGB BLD-MCNC: 12 G/DL (ref 14–18)
INR PPP: 1.24 (ref 0.93–1.08)
LEUKOCYTE ESTERASE UR-ACNC: (no result) LEU/UL
LYMPHOCYTES # BLD: 1.1 10*3/UL (ref 1.2–3.4)
LYMPHOCYTES NFR BLD AUTO: 29.6 % (ref 22–35)
MCH RBC QN AUTO: 29.3 PG (ref 25–35)
MCHC RBC AUTO-ENTMCNC: 32.9 G/DL (ref 31–37)
MCV RBC AUTO: 89 FL (ref 80–105)
MONOCYTES # BLD AUTO: 0.3 10*3/UL (ref 0.1–0.6)
MONOCYTES NFR BLD: 8.1 % (ref 1–6)
PH UR STRIP: 6 [PH] (ref 4.7–8)
PLATELET # BLD: 102 10^3/UL (ref 120–450)
PMV BLD AUTO: 10.6 FL (ref 7–11)
PROT UR STRIP-MCNC: 30 MG/DL
PROTHROMBIN TIME: 14.3 SECONDS (ref 9.4–12.5)
RBC # BLD AUTO: 4.1 10^6/UL (ref 3.5–6.1)
RBC # UR STRIP: (no result) /UL
SP GR UR STRIP: 1.02 (ref 1–1.03)
TROPONIN I SERPL-MCNC: < 0.01 NG/ML
UROBILINOGEN UR STRIP-ACNC: 1 E.U./DL
WBC # BLD AUTO: 3.7 10^3/UL (ref 4.5–11)
WBC #/AREA URNS HPF: (no result) /HPF (ref 0–6)

## 2018-05-23 NOTE — RAD
HISTORY:

Generalized Weakness  



COMPARISON:

02/05/2018. 



FINDINGS:



LUNGS:

No active pulmonary disease.



PLEURA:

No significant pleural effusion identified, no pneumothorax apparent.



CARDIOVASCULAR:

 No radiographic findings to suggest acute or significant 

cardiovascular disease. Position/ configuration of pacemaker

device: Satisfactory.



OSSEOUS STRUCTURES:

No significant abnormalities.



VISUALIZED UPPER ABDOMEN:

Normal.



OTHER FINDINGS:

None.



IMPRESSION:

No active disease. No significant interval change compared to the 

prior examination(s).

## 2018-05-23 NOTE — CARD
--------------- APPROVED REPORT --------------





EKG Measurement

Heart [id number]

MA [id number]

[id number]

[id number]

[id number]



<Conclusion>

AV sequential or dual chamber electronic pacemaker

## 2018-05-23 NOTE — ED PDOC
Arrival/HPI





- General


Chief Complaint: Dizziness/Lightheaded


Time Seen by Provider: 05/23/18 06:59


Historian: Patient





- History of Present Illness


Narrative History of Present Illness (Text): 





05/23/18 07:00





89 year old male, with past medical history of CVA, hypertension, 

hyperlipidemia and A-fib on coumadin, presents to the Emergency department 

complaining of headache and lightheadedness this morning. Patient states he was 

in a store this morning when he suddenly felt dizzy and was provided a seat by 

the . As per patient the episode lasted couple minutes and soon 

resolved. Patient informs similar symptoms in the past. Patient denies any head 

trauma or loss of consciousness. Patient denies any fever, chills, nausea, 

vomiting, diarrhea, abdominal pain, chest pain, shortness of breath, sick 

contact, recent travel or any other complaints. Patient presents to the 

Emergency department for medical evaluation. 





PMD: Dr. Ramirez


Time/Duration: 1-3 hours


Symptom Onset: Sudden


Symptom Course: Improving


Quality: Aching


Activities at Onset: Light


Context: Other (Store)





Past Medical History





- Provider Review


Nursing Documentation Reviewed: Yes





- Infectious Disease


Hx of Infectious Diseases: None





- Tetanus Immunization


Tetanus Immunization: Unknown





- Cardiac


Hx Cardiac Disorders: Yes


Hx Atrial Fibrillation: Yes


Hx Congestive Heart Failure: Yes


Hx Hypertension: Yes


Hx Pacemaker: Yes (4/2018)





- Pulmonary


Hx Respiratory Disorders: No





- Neurological


Hx Neurological Disorder: Yes


HX Cerebrovascular Accident: Yes





- HEENT


Hx HEENT Disorder: No





- Renal


Hx Renal Disorder: No


Other/Comment: has 2w wallis





- Endocrine/Metabolic


Hx Endocrine Disorders: No





- Hematological/Oncological


Hx Blood Disorders: No





- Integumentary


Hx Dermatological Disorder: No





- Musculoskeletal/Rheumatological


Hx Musculoskeletal Disorders: No





- Gastrointestinal


Hx Gastrointestinal Disorders: No





- Genitourinary/Gynecological


Hx Genitourinary Disorders: Yes (urine retention)


Hx Reproductive Disorders: Yes (BPH)





- Psychiatric


Hx Psychophysiologic Disorder: No


Hx Substance Use: No





- Past Surgical History


Past Surgical History: Unable to Obtain





- Surgical History


Other/Comment: PROSTATE





- Anesthesia


Hx Anesthesia Reactions: No


Hx Malignant Hyperthermia: No





- Suicidal Assessment


Feels Threatened In Home Enviroment: No





Family/Social History





- Physician Review


Nursing Documentation Reviewed: Yes


Family/Social History: No Known Family HX


Smoking Status: Never Smoked


Hx Alcohol Use: No


Hx Substance Use: No


Hx Substance Use Treatment: No





Allergies/Home Meds


Allergies/Adverse Reactions: 


Allergies





No Known Allergies Allergy (Verified 05/23/18 06:24)


 








Home Medications: 


 Home Meds











 Medication  Instructions  Recorded  Confirmed


 


Ergocalciferol (Vitamin D2) 50,000 iu PO WED 10/17/14 05/23/18





[Vitamin D2]   














Review of Systems





- Physician Review


All systems were reviewed & negative as marked: Yes





- Review of Systems


Constitutional: Normal.  absent: Fevers


Eyes: Normal


ENT: Normal


Respiratory: Normal.  absent: SOB


Cardiovascular: Normal.  absent: Chest Pain


Gastrointestinal: Normal.  absent: Abdominal Pain, Diarrhea, Nausea, Vomiting


Genitourinary Male: Normal


Musculoskeletal: Normal


Skin: Normal


Neurological: Headache, Dizziness


Endocrine: Normal


Hemo/Lymphatic: Normal


Psychiatric: Normal





Physical Exam


Vital Signs Reviewed: Yes


Vital Signs











  Temp Pulse Resp BP Pulse Ox


 


 05/23/18 10:44  97.6 F  72  18  127/81  95


 


 05/23/18 09:22   60  18  114/59 L  98


 


 05/23/18 06:48   61  18  108/69  95


 


 05/23/18 06:24  98.0 F  63  18  98/51 L  95











Temperature: Afebrile


Blood Pressure: Hypotensive


Pulse: Regular


Respiratory Rate: Normal


Appearance: Positive for: Non-Toxic, Comfortable, Cachectic


Pain Distress: None


Mental Status: Positive for: Alert and Oriented X 3


Finger Stick Blood Glucose: 112





- Systems Exam


Head: Present: Atraumatic, Normocephalic


Pupils: Present: PERRL


Extroacular Muscles: Present: EOMI


Conjunctiva: Present: Normal


Neck: Present: Normal Range of Motion


Respiratory/Chest: Present: Clear to Auscultation, Good Air Exchange.  No: 

Respiratory Distress, Accessory Muscle Use


Cardiovascular: Present: Regular Rate and Rhythm, Normal S1, S2.  No: Murmurs


Abdomen: No: Tenderness, Distention, Peritoneal Signs


Upper Extremity: Present: Normal Inspection.  No: Cyanosis, Edema


Lower Extremity: Present: Normal Inspection.  No: Edema


Neurological: Present: GCS=15, CN II-XII Intact, Speech Normal


Skin: Present: Warm, Dry, Normal Color.  No: Rashes


Psychiatric: Present: Alert, Oriented x 3, Normal Insight, Normal Concentration





Medical Decision Making


ED Course and Treatment: 





05/23/18 07:00


Impression: 89 year old male presents to the Emergency department for headache 

and dizziness.





Plan:


-- Blood Type and screen


-- EKG


-- labs


-- Chest X-ray


-- Urine Culture 


-- Urinalysis 


-- Reassess and disposition





Progress Notes: 





05/23/18 07:00


EKG: Ordered, reviewed, and independently interpreted the EKG.


Rate : 61 BPM


Rhythm : NSR


Interpretation : AV sequential/ dual chamber electronic pacemaker





05/23/18 09:14


Chest X-ray reviewed by radiologist, shows: 


FINDINGS:


LUNGS:


No active pulmonary disease.


PLEURA:


No significant pleural effusion identified, no pneumothorax apparent.


CARDIOVASCULAR:


 No radiographic findings to suggest acute or significant cardiovascular 

disease. Position/ configuration of pacemaker\AICD device: Satisfactory.


OSSEOUS STRUCTURES:


No significant abnormalities.


VISUALIZED UPPER ABDOMEN:


Normal.


OTHER FINDINGS:


None.


IMPRESSION:


No active disease. No significant interval change compared to the prior 

examination(s).





- Lab Interpretations


Lab Results: 








 05/23/18 07:15 





 05/23/18 07:15 





 Lab Results





05/23/18 08:51: Urine Color Yellow, Urine Appearance Clear, Urine pH 6.0, Ur 

Specific Gravity 1.025, Urine Protein 30 H, Urine Glucose (UA) Negative, Urine 

Ketones Negative, Urine Blood Small H, Urine Nitrate Positive H, Urine 

Bilirubin Negative, Urine Urobilinogen 1.0 H, Ur Leukocyte Esterase Moderate H, 

Urine RBC 10 - 15, Urine WBC 25 - 30, Ur Epithelial Cells None, Amorphous 

Sediment Few, Urine Bacteria Many, Urine Other Uyeast


05/23/18 07:15: Sodium 143, Potassium 4.1, Chloride 106, Carbon Dioxide 24, 

Anion Gap 17, BUN 21, Creatinine 1.3, Est GFR (African Amer) > 60, Est GFR (Non-

Af Amer) 52, Random Glucose 130 H, Calcium 9.4, Total Bilirubin 0.7, AST 27, 

ALT 16, Alkaline Phosphatase 49, Lactate Dehydrogenase 541, Total Creatine 

Kinase 85, Troponin I < 0.01  D, NT-Pro-B Natriuret Pep 4720 H, Total Protein 

7.1, Albumin 3.8, Globulin 3.3, Albumin/Globulin Ratio 1.1


05/23/18 07:15: PT 14.3 H, INR 1.24 H


05/23/18 07:15: WBC 3.7 L D, RBC 4.10, Hgb 12.0 L D, Hct 36.5 L, MCV 89.0, MCH 

29.3, MCHC 32.9, RDW 13.8, Plt Count 102 L, MPV 10.6, Gran % 61.2, Lymph % (Auto

) 29.6, Mono % (Auto) 8.1 H, Eos % (Auto) 0.8 L, Baso % (Auto) 0.3, Gran # 2.27

, Lymph # (Auto) 1.1 L, Mono # (Auto) 0.3, Eos # (Auto) 0.0, Baso # (Auto) 0.01


05/23/18 06:44: POC Glucose (mg/dL) 112 H











- RAD Interpretation


Radiology Orders: 








05/23/18 07:00


CHEST PORTABLE [RAD] Stat 











: Radiologist





- EKG Interpretation


Interpreted by ED Physician: Yes


Type: 12 lead EKG





- Medication Orders


Current Medication Orders: 











Discontinued Medications





Cephalexin Monohydrate (Keflex)  500 mg PO STAT STA


   PRN Reason: Protocol


   Stop: 05/23/18 10:47











- Scribe Statement


The provider has reviewed the documentation as recorded by the Scribe


Elvis Schulz.





All medical record entries made by the Scribe were at my direction and 

personally dictated by me. I have reviewed the chart and agree that the record 

accurately reflects my personal performance of the history, physical exam, 

medical decision making, and the department course for this patient. I have 

also personally directed, reviewed, and agree with the discharge instructions 

and disposition.





Disposition/Present on Arrival





- Present on Arrival


Any Indicators Present on Arrival: Yes


History of DVT/PE: No


History of Uncontrolled Diabetes: No


Urinary Catheter: Yes


History of Decub. Ulcer: No


History Surgical Site Infection Following: None





- Disposition


Have Diagnosis and Disposition been Completed?: Yes


Diagnosis: 


 UTI (urinary tract infection), Near syncope





Disposition: HOME/ ROUTINE


Disposition Time: 10:50


Patient Plan: Discharge


Condition: GOOD


Discharge Instructions (ExitCare):  Urinary Tract Infection, Adult (DC)


Additional Instructions: 


Mr Clayton-





So sorry this happened to you today.  All of your tests check out ok, but you 

do have an infection in your urine.  Keflex is three times a day for ten days. 

  Follow up with your regular doctor next week.  Return to us if worse or any 

new symptoms.








Octavio-


Dr. Iam Gabrin


Prescriptions: 


Cephalexin [cephalexin] 500 mg PO TID #30 cap


Forms:  [employer] (English)